# Patient Record
Sex: FEMALE | Race: OTHER | HISPANIC OR LATINO | Employment: FULL TIME | URBAN - METROPOLITAN AREA
[De-identification: names, ages, dates, MRNs, and addresses within clinical notes are randomized per-mention and may not be internally consistent; named-entity substitution may affect disease eponyms.]

---

## 2021-05-07 ENCOUNTER — OFFICE VISIT (OUTPATIENT)
Dept: OBGYN CLINIC | Facility: CLINIC | Age: 26
End: 2021-05-07

## 2021-05-07 VITALS
WEIGHT: 137.8 LBS | DIASTOLIC BLOOD PRESSURE: 76 MMHG | RESPIRATION RATE: 16 BRPM | SYSTOLIC BLOOD PRESSURE: 122 MMHG | HEIGHT: 62 IN | HEART RATE: 86 BPM | BODY MASS INDEX: 25.36 KG/M2

## 2021-05-07 DIAGNOSIS — Z01.419 ENCOUNTER FOR ANNUAL ROUTINE GYNECOLOGICAL EXAMINATION: Primary | ICD-10-CM

## 2021-05-07 DIAGNOSIS — Z12.4 CERVICAL CANCER SCREENING: ICD-10-CM

## 2021-05-07 DIAGNOSIS — Z72.51 HIGH RISK HETEROSEXUAL BEHAVIOR: ICD-10-CM

## 2021-05-07 PROCEDURE — 99385 PREV VISIT NEW AGE 18-39: CPT | Performed by: OBSTETRICS & GYNECOLOGY

## 2021-05-07 PROCEDURE — G0145 SCR C/V CYTO,THINLAYER,RESCR: HCPCS | Performed by: FAMILY MEDICINE

## 2021-05-07 RX ORDER — LORATADINE 10 MG/1
10 TABLET ORAL DAILY
COMMUNITY

## 2021-05-07 NOTE — ASSESSMENT & PLAN NOTE
Patient here for annual visit  She admits to not ever having a breast exam or pelvic exam/pap smear done  Sexually active currently, not using any birth control or barrier   No complaints today    - Breast exam performed today  - PAP smear today  - GC/Chlamydia ordered    Will contact patient with results

## 2021-05-07 NOTE — PROGRESS NOTES
[facility] Office Visit  Jocelynn Hicks 31 y o  female   MRN: [id number] : 1995  ENCOUNTER: 2021 10:43 AM    Assessment and Plan   Encounter for annual routine gynecological examination  Patient here for annual visit  She admits to not ever having a breast exam or pelvic exam/pap smear done  Sexually active currently, not using any birth control or barrier  No complaints today    - Breast exam performed today  - PAP smear today  - GC/Chlamydia ordered    Will contact patient with results      Chief Complaint     Chief Complaint   Patient presents with    Gynecologic Exam       History of Present Illness   Jocelynn Hicks is a 31y o -year-old female who presents today for an annual exam  She currently has no complaints  States that she has never had a PAP smear or pelvic exam done  No STD testing history  Up to date on HPV vaccination  Review of Systems   Review of Systems   Constitutional: Negative for chills, fatigue and fever  Respiratory: Negative for cough, chest tightness, shortness of breath and wheezing  Cardiovascular: Negative for chest pain and palpitations  Gastrointestinal: Negative for abdominal distention, abdominal pain, constipation, diarrhea, nausea and vomiting  Genitourinary: Negative for difficulty urinating, dyspareunia, dysuria, hematuria, pelvic pain, vaginal bleeding, vaginal discharge and vaginal pain  All other systems reviewed and are negative  Active Problem List     Patient Active Problem List   Diagnosis    Encounter for annual routine gynecological examination       Past Medical History, Past Surgical History, Family History, and Social History were reviewed and updated today as appropriate      Objective   /76 (BP Location: Right arm, Patient Position: Sitting, Cuff Size: Standard)   Pulse 86   Resp 16   Ht 5' 2" (1 575 m)   Wt 62 5 kg (137 lb 12 8 oz)   LMP 2021 (Exact Date)   BMI 25 20 kg/m²     Physical Exam  Vitals signs reviewed  Constitutional:       General: She is not in acute distress  Appearance: She is well-developed  She is not diaphoretic  HENT:      Head: Normocephalic and atraumatic  Eyes:      General: No scleral icterus  Right eye: No discharge  Left eye: No discharge  Conjunctiva/sclera: Conjunctivae normal    Pulmonary:      Effort: Pulmonary effort is normal  No respiratory distress  Chest:      Breasts: Breasts are symmetrical          Right: Normal  No bleeding  Left: Normal  No bleeding  Abdominal:      General: There is no distension  Palpations: Abdomen is soft  Tenderness: There is no abdominal tenderness  Genitourinary:     General: Normal vulva  Musculoskeletal: Normal range of motion  General: No tenderness  Lymphadenopathy:      Cervical: No cervical adenopathy  Skin:     General: Skin is warm and dry  Coloration: Skin is not pale  Findings: No erythema  Neurological:      Mental Status: She is alert  Pertinent Laboratory/Diagnostic Studies:  No results found for: GLUCOSE, BUN, CREATININE, CALCIUM, NA, K, CO2, CL  No results found for: ALT, AST, GGT, ALKPHOS, BILITOT    No results found for: WBC, HGB, HCT, MCV, PLT    No results found for: TSH    No results found for: CHOL  No results found for: TRIG  No results found for: HDL  No results found for: LDLCALC  No results found for: HGBA1C    No results found for this or any previous visit  Orders Placed This Encounter   Procedures    Chlamydia/GC amplified DNA by PCR         Current Medications     Current Outpatient Medications   Medication Sig Dispense Refill    loratadine (CLARITIN) 10 mg tablet Take 10 mg by mouth daily      Triamcinolone Acetonide (NASACORT ALLERGY 24HR NA) into each nostril       No current facility-administered medications for this visit          ALLERGIES:  No Known Allergies    Health Maintenance     Health Maintenance   Topic Date Due  HPV Vaccine (1 - 2-dose series) Never done    Depression Screening PHQ  Never done    HIV Screening  Never done    COVID-19 Vaccine (1) Never done    BMI: Followup Plan  Never done    Annual Physical  Never done    DTaP,Tdap,and Td Vaccines (1 - Tdap) Never done    Cervical Cancer Screening  Never done    Influenza Vaccine (Season Ended) 09/01/2021    BMI: Adult  05/07/2022    Pneumococcal Vaccine: Pediatrics (0 to 5 Years) and At-Risk Patients (6 to 59 Years)  Aged Out    HIB Vaccine  Aged Out    Hepatitis B Vaccine  Aged Out    IPV Vaccine  Aged Out    Hepatitis A Vaccine  Aged Out    Meningococcal ACWY Vaccine  Aged Out       There is no immunization history on file for this patient  Bart Arboleda MD   [address]  5/7/2021  10:43 AM    Parts of this note were dictated using Spreadsave dictation software and may have sounds-like errors due to variation in pronunciation

## 2021-05-14 LAB
C TRACH DNA SPEC QL NAA+PROBE: ABNORMAL
LAB AP GYN PRIMARY INTERPRETATION: NORMAL
Lab: NORMAL
N GONORRHOEA DNA SPEC QL NAA+PROBE: ABNORMAL

## 2021-05-24 ENCOUNTER — TELEPHONE (OUTPATIENT)
Dept: OBGYN CLINIC | Facility: CLINIC | Age: 26
End: 2021-05-24

## 2021-05-24 NOTE — TELEPHONE ENCOUNTER
Informed pt of normal pap result  Gonorrhea/chlamydia  specimen invalid, pt verbalizes understanding  Offered to  place new order for patient to go to lab to have redone  Patient declines

## 2021-07-07 NOTE — PROGRESS NOTES
Assessment/Plan:     Diagnoses and all orders for this visit:    Abscess of left Bartholin's gland    Other orders  -     cephalexin (KEFLEX) 500 mg capsule  -     sulfamethoxazole-trimethoprim (BACTRIM DS) 800-160 mg per tablet  -     meclizine (ANTIVERT) 25 mg tablet; Take 25 mg by mouth          Subjective:      Patient ID: Kylee Del Rosario is a 31 y o  female  Patient with previous history of Bartholin's cysts presents for I&D of left Bartholin's abscess  Please see procedure note  Review of Systems   Constitutional: Negative  HENT: Negative  Eyes: Negative  Respiratory: Negative  Cardiovascular: Negative  Gastrointestinal: Negative  Endocrine: Negative  Genitourinary:        Perineal pain   Musculoskeletal: Negative  Skin: Negative  Neurological: Negative  Psychiatric/Behavioral: Negative  Objective:      /75 (BP Location: Right arm, Patient Position: Sitting, Cuff Size: Adult)   Pulse 103   Ht 5' 2" (1 575 m)   Wt 56 2 kg (124 lb)   LMP 06/30/2021 (Exact Date)   BMI 22 68 kg/m²          Physical Exam  Exam conducted with a chaperone present  Constitutional:       General: She is not in acute distress  Appearance: She is well-developed  She is not diaphoretic  HENT:      Head: Normocephalic and atraumatic  Eyes:      Pupils: Pupils are equal, round, and reactive to light  Neck:      Trachea: No tracheal deviation  Cardiovascular:      Rate and Rhythm: Normal rate  Pulmonary:      Effort: Pulmonary effort is normal  No respiratory distress  Breath sounds: No stridor  Abdominal:      General: There is no distension  Palpations: Abdomen is soft  Genitourinary:     General: Normal vulva  Labia:         Right: No rash, tenderness, lesion or injury  Left: Rash, tenderness and lesion present  No injury  Musculoskeletal:         General: No tenderness or deformity  Normal range of motion        Cervical back: Normal range of motion  Skin:     General: Skin is warm and dry  Coloration: Skin is not pale  Findings: No erythema or rash  Neurological:      Mental Status: She is alert and oriented to person, place, and time        Coordination: Coordination normal

## 2021-07-08 ENCOUNTER — OFFICE VISIT (OUTPATIENT)
Dept: OBGYN CLINIC | Facility: CLINIC | Age: 26
End: 2021-07-08

## 2021-07-08 VITALS
HEART RATE: 103 BPM | DIASTOLIC BLOOD PRESSURE: 75 MMHG | WEIGHT: 124 LBS | SYSTOLIC BLOOD PRESSURE: 116 MMHG | HEIGHT: 62 IN | BODY MASS INDEX: 22.82 KG/M2

## 2021-07-08 DIAGNOSIS — N75.1 ABSCESS OF LEFT BARTHOLIN'S GLAND: Primary | ICD-10-CM

## 2021-07-08 PROCEDURE — 56420 I&D BARTHOLINS GLAND ABSCESS: CPT | Performed by: OBSTETRICS & GYNECOLOGY

## 2021-07-08 PROCEDURE — 99213 OFFICE O/P EST LOW 20 MIN: CPT | Performed by: OBSTETRICS & GYNECOLOGY

## 2021-07-08 RX ORDER — CEPHALEXIN 500 MG/1
CAPSULE ORAL
COMMUNITY
Start: 2021-07-07

## 2021-07-08 RX ORDER — SULFAMETHOXAZOLE AND TRIMETHOPRIM 800; 160 MG/1; MG/1
TABLET ORAL
COMMUNITY
Start: 2021-07-07

## 2021-07-08 RX ORDER — MECLIZINE HYDROCHLORIDE 25 MG/1
25 TABLET ORAL
COMMUNITY

## 2021-07-08 NOTE — LETTER
July 8, 2021     Patient: Lashawn Johnson   YOB: 1995   Date of Visit: 7/8/2021       To Whom it May Concern:    Lashawn Johnson is under my professional care  She was seen in my office on 7/8/2021  She may return to work on 7/12/2021  If you have any questions or concerns, please don't hesitate to call           Sincerely,          Leeanne Lazar MD        CC: Lashawn Romeroom

## 2021-07-09 NOTE — PROGRESS NOTES
Incision and drain    Date/Time: 7/8/2021 11:00 AM  Performed by: Rodrigue Bobby MD  Authorized by: Rodrigue Bobby MD   Universal Protocol:  Procedure performed by:  Consent: Written consent obtained  Risks and benefits: risks, benefits and alternatives were discussed  Consent given by: patient  Time out: Immediately prior to procedure a "time out" was called to verify the correct patient, procedure, equipment, support staff and site/side marked as required  Patient understanding: patient states understanding of the procedure being performed  Patient consent: the patient's understanding of the procedure matches consent given  Procedure consent: procedure consent matches procedure scheduled  Relevant documents: relevant documents present and verified  Required items: required blood products, implants, devices, and special equipment available  Patient identity confirmed: verbally with patient      Patient location:  Clinic  Location:     Type:  Bartholin cyst and abscess    Size:  3-4cm    Location:  Anogenital    Anogenital location:  Bartholin's gland  Pre-procedure details:     Skin preparation:  Betadine  Anesthesia (see MAR for exact dosages): Anesthesia method:  Local infiltration    Local anesthetic:  Lidocaine 1% w/o epi  Procedure details:     Complexity:  Simple    Needle aspiration: no      Incision types:  Stab incision    Scalpel blade:  10    Approach:  Open    Incision depth:  Submucosal    Wound management:  Probed and deloculated    Drainage:  Bloody and purulent    Drainage amount: Moderate    Wound treatment:  Drain placed    Packing materials: Word catheter  Post-procedure details:     Patient tolerance of procedure:   Tolerated well, no immediate complications

## 2021-07-15 ENCOUNTER — OFFICE VISIT (OUTPATIENT)
Dept: OBGYN CLINIC | Facility: CLINIC | Age: 26
End: 2021-07-15

## 2021-07-15 VITALS
SYSTOLIC BLOOD PRESSURE: 124 MMHG | HEIGHT: 62 IN | DIASTOLIC BLOOD PRESSURE: 83 MMHG | WEIGHT: 128 LBS | BODY MASS INDEX: 23.55 KG/M2 | HEART RATE: 61 BPM

## 2021-07-15 DIAGNOSIS — N75.0 BARTHOLIN CYST: Primary | ICD-10-CM

## 2021-07-15 PROCEDURE — 99213 OFFICE O/P EST LOW 20 MIN: CPT | Performed by: OBSTETRICS & GYNECOLOGY

## 2021-07-16 NOTE — PROGRESS NOTES
Post-op Note - OB/GYN   Yamileth Palacios 31 y o  female MRN: [id number]  Unit/Bed#:  Encounter: [id number]    Assessment:  31 y o  G0 s/p incision and drainage of Bartholin cyst with word catheter placement    Plan:  No further follow up  Annual due in September    Subjective/Objective     Patient reports word catheter fell out after three days and she had one additional day of drainage afterward  Denies pain, further drainage  Currently attempting pregnancy  Cleared for sexual activity  Counseled on taking a prenatal vitamin  Up to date with Pap  Counseled on management and recurrence  Objective:     Vitals: Blood pressure 124/83, pulse 61, height 5' 2" (1 575 m), weight 58 1 kg (128 lb), last menstrual period 06/30/2021, not currently breastfeeding  Physical Exam:     Physical Exam  Constitutional:       General: She is not in acute distress  Appearance: She is well-developed  She is not diaphoretic  HENT:      Head: Normocephalic and atraumatic  Eyes:      Pupils: Pupils are equal, round, and reactive to light  Neck:      Trachea: No tracheal deviation  Cardiovascular:      Rate and Rhythm: Normal rate  Pulmonary:      Effort: Pulmonary effort is normal  No respiratory distress  Breath sounds: No stridor  Abdominal:      General: There is no distension  Palpations: Abdomen is soft  Tenderness: There is no abdominal tenderness  There is no guarding or rebound  Genitourinary:     Labia:         Right: No rash, tenderness, lesion or injury  Left: No rash, tenderness, lesion or injury  Musculoskeletal:         General: No tenderness or deformity  Normal range of motion  Cervical back: Normal range of motion  Skin:     General: Skin is warm and dry  Coloration: Skin is not pale  Findings: No erythema or rash  Neurological:      Mental Status: She is alert and oriented to person, place, and time        Coordination: Coordination normal  No results found for: WBC, HGB, HCT, MCV, PLT            Marlene Olivia MD  07/16/21

## 2022-09-06 ENCOUNTER — OFFICE VISIT (OUTPATIENT)
Dept: OBGYN CLINIC | Facility: CLINIC | Age: 27
End: 2022-09-06

## 2022-09-06 VITALS
HEIGHT: 62 IN | DIASTOLIC BLOOD PRESSURE: 73 MMHG | BODY MASS INDEX: 22.93 KG/M2 | WEIGHT: 124.6 LBS | SYSTOLIC BLOOD PRESSURE: 108 MMHG | HEART RATE: 109 BPM

## 2022-09-06 DIAGNOSIS — N92.6 MISSED PERIOD: Primary | ICD-10-CM

## 2022-09-06 DIAGNOSIS — Z31.69 ENCOUNTER FOR INFERTILITY COUNSELING: ICD-10-CM

## 2022-09-06 LAB — SL AMB POCT URINE HCG: NEGATIVE

## 2022-09-06 PROCEDURE — 81025 URINE PREGNANCY TEST: CPT | Performed by: OBSTETRICS & GYNECOLOGY

## 2022-09-06 PROCEDURE — 99213 OFFICE O/P EST LOW 20 MIN: CPT | Performed by: OBSTETRICS & GYNECOLOGY

## 2022-09-06 RX ORDER — LEVOCETIRIZINE DIHYDROCHLORIDE 5 MG/1
5 TABLET, FILM COATED ORAL EVERY EVENING
COMMUNITY

## 2022-09-06 NOTE — PROGRESS NOTES
[address]   Infertility Visit  Name: Jami Pedroza  MRN: [id number]  : 1995      ASSESSMENT/PLAN:  Problem   Encounter for Infertility Counseling    Regular menses every 28-30 days, lasting 5 days  No significant PMHx or FHx  Denies prior vaginal or abdominal surgeries  Denies prior STD/STIs  Plan for male semen analysis  Plan for HSG for assessment of tubal patency during days 5-9 of menses  Patient to call clinic with first day of menses for appropriate scheduling     Missed Period    Due for period yesterday   Urine pregnancy test negative           SUBJECTIVE:  Patient is a pleasant 30yo G0 who presents to discuss infertility accompanied by her partner, Amanda Bach and Amanda Canales have been trying to attain pregnancy for the last year without success  Neither patient nor partner report a history of STD/STIs or significant trauma requiring surgical intervention  Amanda Canales has not had pregnancies with other partners and has not completed semen analysis  Dariana Ho denies history of abdominal or vaginal surgeries  She reports monthly menses occurring every 28-30 days lasting 5 days  Denies any chronic medical conditions  Denies significant family history  We discussed further assessment consisting of male semen analysis and imaging to assess tubal patency  Patient is amenable to further workup  We discussed follow-up in clinic one week after imaging to discuss results  Patient encouraged to call clinic with any future questions or concerns  OBJECTIVE:  Vitals:    22 1118   BP: 108/73   Pulse: (!) 109       Physical Exam  Vitals and nursing note reviewed  Constitutional:       General: She is not in acute distress  HENT:      Head: Normocephalic  Right Ear: External ear normal       Left Ear: External ear normal    Eyes:      General: No scleral icterus  Right eye: No discharge  Left eye: No discharge        Conjunctiva/sclera: Conjunctivae normal  Cardiovascular:      Rate and Rhythm: Normal rate and regular rhythm  Pulses: Normal pulses  Heart sounds: Normal heart sounds  Pulmonary:      Effort: Pulmonary effort is normal  No respiratory distress  Breath sounds: Normal breath sounds  Abdominal:      General: Abdomen is flat  There is no distension  Palpations: Abdomen is soft  Tenderness: There is no abdominal tenderness  There is no guarding  Musculoskeletal:         General: No swelling or tenderness  Normal range of motion  Cervical back: Normal range of motion  Right lower leg: No edema  Left lower leg: No edema  Skin:     General: Skin is warm and dry  Capillary Refill: Capillary refill takes less than 2 seconds  Neurological:      Mental Status: She is alert and oriented to person, place, and time  Mental status is at baseline     Psychiatric:         Mood and Affect: Mood normal          Behavior: Behavior normal            Louie Pastrana MD  OB/GYN PGY-2  9/6/2022  11:48 AM

## 2022-10-03 ENCOUNTER — TELEPHONE (OUTPATIENT)
Dept: OBGYN CLINIC | Facility: CLINIC | Age: 27
End: 2022-10-03

## 2022-10-03 NOTE — TELEPHONE ENCOUNTER
LM to notify pt she will have to call office with first day of period to have HSG scheduled  Call back number provided

## 2024-02-21 PROBLEM — Z01.419 ENCOUNTER FOR ANNUAL ROUTINE GYNECOLOGICAL EXAMINATION: Status: RESOLVED | Noted: 2021-05-07 | Resolved: 2024-02-21

## 2024-03-11 ENCOUNTER — OFFICE VISIT (OUTPATIENT)
Dept: OBGYN CLINIC | Facility: CLINIC | Age: 29
End: 2024-03-11

## 2024-03-11 VITALS
DIASTOLIC BLOOD PRESSURE: 79 MMHG | HEIGHT: 62 IN | BODY MASS INDEX: 24.84 KG/M2 | SYSTOLIC BLOOD PRESSURE: 117 MMHG | RESPIRATION RATE: 18 BRPM | WEIGHT: 135 LBS | HEART RATE: 82 BPM

## 2024-03-11 DIAGNOSIS — R10.2 PELVIC PAIN: Primary | ICD-10-CM

## 2024-03-11 PROCEDURE — 99213 OFFICE O/P EST LOW 20 MIN: CPT | Performed by: OBSTETRICS & GYNECOLOGY

## 2024-03-11 RX ORDER — MONTELUKAST SODIUM 10 MG/1
10 TABLET ORAL EVERY EVENING
COMMUNITY
Start: 2024-02-13

## 2024-03-11 NOTE — ASSESSMENT & PLAN NOTE
Acute, no risk factors for STDs  Likely due to a ruptured cyst    Plan:  Ibuprofen 400 mg q 6 h PRN  Transvaginal US  Follow up in 2 weeks for scheduled annual visit

## 2024-03-11 NOTE — PROGRESS NOTES
"Assessment/Plan:    Pelvic pain  Acute, no risk factors for STDs  Likely due to a ruptured cyst    Plan:  Ibuprofen 400 mg q 6 h PRN  Transvaginal US  Follow up in 2 weeks for scheduled annual visit        Diagnoses and all orders for this visit:    Pelvic pain  -     US pelvis complete w transvaginal; Future    Other orders  -     montelukast (SINGULAIR) 10 mg tablet; Take 10 mg by mouth every evening          Subjective:      Patient ID: Mikala Cunha is a 28 y.o. female.    Pelvic Pain  The patient's primary symptoms include pelvic pain. This is a new problem. Episode onset: 3 days ago. The problem occurs constantly. The problem has been unchanged (Pain was triggered after intercourse. Pain was really bad after, improved a litte, byt has been constant since then.). The pain is moderate. The problem affects both sides. She is not pregnant. Pertinent negatives include no constipation, diarrhea, dysuria, hematuria, nausea or rash. The symptoms are aggravated by activity. She has tried NSAIDs (took one pill of 200 mg of Advil) for the symptoms. The treatment provided no relief. She is sexually active. No, her partner does not have an STD. She uses nothing for contraception. Her menstrual history has been regular. There is no history of an abdominal surgery, a  section, miscarriage, ovarian cysts or vaginosis.     LMP: 24    The following portions of the patient's history were reviewed and updated as appropriate: allergies, current medications, past medical history, past social history, past surgical history, and problem list.    Review of Systems   Gastrointestinal:  Negative for constipation, diarrhea and nausea.   Genitourinary:  Positive for pelvic pain. Negative for dysuria and hematuria.   Skin:  Negative for rash.         Objective:      /79 (BP Location: Right arm, Patient Position: Sitting, Cuff Size: Adult)   Pulse 82   Resp 18   Ht 5' 2\" (1.575 m)   Wt 61.2 kg (135 lb)   LMP " 02/24/2024 (Exact Date)   BMI 24.69 kg/m²          Physical Exam  Constitutional:       General: She is not in acute distress.     Appearance: She is normal weight. She is not ill-appearing or toxic-appearing.   HENT:      Head: Normocephalic and atraumatic.      Right Ear: External ear normal.      Left Ear: External ear normal.   Eyes:      General: No scleral icterus.     Conjunctiva/sclera: Conjunctivae normal.   Abdominal:      General: Abdomen is flat. There is no distension.      Palpations: Abdomen is soft. There is no mass.      Tenderness: There is abdominal tenderness. There is no right CVA tenderness, left CVA tenderness, guarding or rebound.      Comments: Infraumbilical tenderness.   Skin:     General: Skin is warm and dry.      Capillary Refill: Capillary refill takes less than 2 seconds.   Neurological:      Mental Status: She is alert.

## 2024-03-14 ENCOUNTER — HOSPITAL ENCOUNTER (OUTPATIENT)
Dept: ULTRASOUND IMAGING | Facility: HOSPITAL | Age: 29
Discharge: HOME | End: 2024-03-14
Payer: COMMERCIAL

## 2024-03-14 DIAGNOSIS — R10.2 PELVIC PAIN: ICD-10-CM

## 2024-03-14 PROCEDURE — 76856 US EXAM PELVIC COMPLETE: CPT

## 2024-03-14 PROCEDURE — 76830 TRANSVAGINAL US NON-OB: CPT

## 2024-04-04 ENCOUNTER — TELEPHONE (OUTPATIENT)
Dept: OBGYN CLINIC | Facility: CLINIC | Age: 29
End: 2024-04-04

## 2024-04-04 NOTE — TELEPHONE ENCOUNTER
Spoke to pt, discussed finding of US, nothing concerning at this time.  Same fibroid present.  Pain has resolved, will follow up as needed.

## 2024-12-06 ENCOUNTER — ULTRASOUND (OUTPATIENT)
Dept: OBGYN CLINIC | Facility: CLINIC | Age: 29
End: 2024-12-06

## 2024-12-06 VITALS — DIASTOLIC BLOOD PRESSURE: 74 MMHG | WEIGHT: 167.4 LBS | SYSTOLIC BLOOD PRESSURE: 112 MMHG | BODY MASS INDEX: 30.62 KG/M2

## 2024-12-06 DIAGNOSIS — O36.63X0 EXCESSIVE FETAL GROWTH AFFECTING MANAGEMENT OF PREGNANCY IN THIRD TRIMESTER, SINGLE OR UNSPECIFIED FETUS: ICD-10-CM

## 2024-12-06 DIAGNOSIS — Z34.93 THIRD TRIMESTER PREGNANCY: Primary | ICD-10-CM

## 2024-12-06 PROCEDURE — PNV: Performed by: PHYSICIAN ASSISTANT

## 2024-12-06 RX ORDER — BLOOD SUGAR DIAGNOSTIC
STRIP MISCELLANEOUS
COMMUNITY
Start: 2024-12-05

## 2024-12-06 RX ORDER — RIBOFLAVIN (VITAMIN B2) 100 MG
100 TABLET ORAL DAILY
COMMUNITY

## 2024-12-06 RX ORDER — ALBUTEROL SULFATE 90 UG/1
2 INHALANT RESPIRATORY (INHALATION) EVERY 6 HOURS PRN
COMMUNITY

## 2024-12-06 RX ORDER — ACYCLOVIR 400 MG/1
TABLET ORAL
COMMUNITY
Start: 2024-12-04

## 2024-12-06 NOTE — PROGRESS NOTES
OB/GYN  PN Visit  Mikala Cunha  [id number]  2024  7:12 AM  Kaitlyn Perez PA-C    S: 29 y.o.  Unknown here for PN visit. Pregnancy complicated by questionable diagnosis of GDM and LGA.     OB complaints:  Denies c/o n/v/ha, no edema, no smoking, no DV.   No vb/lof  No cramping/ctxns or signs of PTL.    She reports that she is transferring to us from [facility].  She notes that at one of her recent MFM appts baby noted to have >AC and polyhydramnios. She was given Dexcom and diagnosis of GDM. Pt passed 1 hour glucose at 132 and did not do 3 hour GTT. She notes that her dexcom was not calibrated and questions the accuracy of the results that they are basing their plan off of.       O:    Pre-Myrna Vitals    Flowsheet Row Most Recent Value   Prenatal Assessment    Prenatal Vitals    Blood Pressure 112/74   Weight - Scale 75.9 kg (167 lb 6.4 oz)   Urine Albumin/Glucose    Dilation/Effacement/Station    Vaginal Drainage    Edema             Gen: no acute distress, nonlabored breathing.  OB exam completed: fundal height, +FHT.  Urine: -/-    A/P:    1. Third trimester pregnancy  -     Ambulatory Referral to Maternal Fetal Medicine; Future; Expected date: 2024  2. Excessive fetal growth affecting management of pregnancy in third trimester, single or unspecified fetus  -     Ambulatory Referral to Maternal Fetal Medicine; Future; Expected date: 2024  -     Ambulatory Referral to Maternal Fetal Medicine; Future; Expected date: 2024      #1. Unknown GESTATION  Labor precautions reviewed  Fetal kick counts reviewed      Overview:     Labs UTD  Pap smear:       GC/CT: will runoff of urine  Prenatal Panel: normal  Blood Type: O+ RhoGam not indicated   GBS: plan at 36 weeks    Genetic Testing: neg CF and SMN carrier, normal NIPS    Vaccines:  Tdap: 11/15/24   Flu: declines    Birth Plan:  if able  Yellow packet given and consents signed to be returned.   Feeding Plan: breast   Breast pump: has  one at home  Pediatrician: will plan to establish w her peds office  Contraception:   Ultrasounds: 11/29/24 vertex, Roxanne Wnl (17.5cm) 11/15/24 AC > 95th%      RTC in 2 weeks    Kaitlyn Perez PA-C  12/11/2024  7:12 AM

## 2024-12-09 ENCOUNTER — TELEPHONE (OUTPATIENT)
Age: 29
End: 2024-12-09

## 2024-12-09 ENCOUNTER — TELEPHONE (OUTPATIENT)
Facility: HOSPITAL | Age: 29
End: 2024-12-09

## 2024-12-09 NOTE — TELEPHONE ENCOUNTER
Patient transferring from [facility] Maternal Fetal Medicine.  Decision tree denied scheduling.  Please call with an appointment.  Thank you.

## 2024-12-09 NOTE — TELEPHONE ENCOUNTER
----- Message from Alex Cabrera MD sent at 12/9/2024  1:30 PM EST -----  Hi Ignacia,    She had a normal detailed US and fetal echo with [facility] so she will only need a growth US with us.    Thank you,    Ryan  ----- Message -----  From: Juliana Reyes  Sent: 12/9/2024  12:33 PM EST  To: Alex Cabrera MD    Hi Dr. Cabrera! This PT is a tx from [facility]. Does she need a detailed with us, or just a growth?

## 2024-12-10 NOTE — PATIENT INSTRUCTIONS
-Dexcom G7 CGM in place  with GMI 6%.  -Check A1c.  -Start self monitoring blood glucose fasting and 2 hours post start of each meal.  -Report via glucose flowsheet every Thursday.   -Glucose goals fasting 60-95 mg/dL; 2 hours post start of each meal 120 mg/dL or less.   -Aim to eat every 2 to 3.5 hours during the day with a combination of carbohydrates and protein per meal/snack.   -Minimum total daily carbohydrates 175 grams paired with half the grams in protein.  -No more than 8 to 10 hours of fasting overnight.  -Always have a bedtime snack with combination of carbohydrates and protein.  -Refer to [employer] online.   -Walk up to 30 minutes a day.   -Keep a 3 day diet log to review with dietitian.   -Follow up with dietitian as recommended.   -Recommended weight gain with pre-pregnancy BMI <24 would be 25 to 35 lbs. Current weight gain 34 lbs at 32 weeks gestation.   -If dietary modifications are ineffective with maintaining glucose readings within goal; insulin and/or Metformin will be recommended. First line agent would be insulin.   -If anti-diabetes medications are started; increase in fetal surveillance will be recommended.   -Hyperglycemia during pregnancy is associated with fetal macrosomia; birth injury;  hypoglycemia; polyhydramnios; pre-term labor; pre-eclampsia; ; etc.   -Continue follow up with OB and MFM as recommended.  -Stay in close contact with diabetes team.   Thank you for choosing us for your  care today.  If you have any questions about your ultrasound or care, please do not hesitate to contact us or your primary obstetrician.        Some general instructions for your pregnancy are:    Exercise: Aim for 150 minutes per week of regular exercise.  Walking is great!  Nutrition: Choose healthy sources of calcium, iron, and protein.  Avoid ultraprocessed foods and added sugar.  Learn about Preeclampsia: preeclampsia is a common, potentially serious high blood  pressure complication in pregnancy.  A blood pressure of 140mmHg (systolic or top number) or 90mmHg (diastolic or bottom number) should be evaluated by your doctor.  Aspirin is sometimes prescribed in early pregnancy to prevent preeclampsia in women with risk factors - ask your obstetrician if you should be on this medication.  For more resources, visit:  https://www.[email or web address].org/preeclampsia  If you smoke, please try to quit completely but also try to reduce your smoking by as much as possible (as soon as possible).  Do not vape.  Please also avoid cannabis products.  Other warning signs to watch out for in pregnancy or postpartum: chest pain, obstructed breathing or shortness of breath, seizures, thoughts of hurting yourself or your baby, bleeding, a painful or swollen leg, fever, or headache (see [employer] POST-BIRTH Warning Signs campaign).  If these happen call 911.  Itching is also not normal in pregnancy and if you experience this, especially over your hands and feet, potentially worse at night, notify your doctors.

## 2024-12-11 ENCOUNTER — ULTRASOUND (OUTPATIENT)
Dept: PERINATAL CARE | Facility: CLINIC | Age: 29
End: 2024-12-11
Payer: COMMERCIAL

## 2024-12-11 VITALS
HEIGHT: 62 IN | BODY MASS INDEX: 30.29 KG/M2 | WEIGHT: 164.6 LBS | SYSTOLIC BLOOD PRESSURE: 124 MMHG | DIASTOLIC BLOOD PRESSURE: 62 MMHG | HEART RATE: 101 BPM

## 2024-12-11 DIAGNOSIS — Z36.89 ENCOUNTER FOR ULTRASOUND TO CHECK FETAL GROWTH: ICD-10-CM

## 2024-12-11 DIAGNOSIS — Z3A.32 32 WEEKS GESTATION OF PREGNANCY: Primary | ICD-10-CM

## 2024-12-11 DIAGNOSIS — O34.10 UTERINE FIBROID IN PREGNANCY: ICD-10-CM

## 2024-12-11 DIAGNOSIS — R00.2 PALPITATIONS: ICD-10-CM

## 2024-12-11 DIAGNOSIS — Z34.93 THIRD TRIMESTER PREGNANCY: ICD-10-CM

## 2024-12-11 DIAGNOSIS — D25.9 UTERINE FIBROID IN PREGNANCY: ICD-10-CM

## 2024-12-11 DIAGNOSIS — O99.810 ABNORMAL GLUCOSE AFFECTING PREGNANCY: ICD-10-CM

## 2024-12-11 DIAGNOSIS — O36.63X0 EXCESSIVE FETAL GROWTH AFFECTING MANAGEMENT OF PREGNANCY IN THIRD TRIMESTER, SINGLE OR UNSPECIFIED FETUS: ICD-10-CM

## 2024-12-11 PROCEDURE — 99204 OFFICE O/P NEW MOD 45 MIN: CPT | Performed by: STUDENT IN AN ORGANIZED HEALTH CARE EDUCATION/TRAINING PROGRAM

## 2024-12-11 PROCEDURE — 76816 OB US FOLLOW-UP PER FETUS: CPT | Performed by: STUDENT IN AN ORGANIZED HEALTH CARE EDUCATION/TRAINING PROGRAM

## 2024-12-11 NOTE — LETTER
2024     Kaitlyn Perez PA-C  [address] [facility]  [place] [address]    Patient: Mikala Cunha   YOB: 1995   Date of Visit: 2024       Dear Dr. Perez:    Thank you for referring Mikala Cunha to me for evaluation. Below are my notes for this consultation.    If you have questions, please do not hesitate to call me. I look forward to following your patient along with you.         Sincerely,        Almaz Jc MD        CC: No Recipients    Almaz Jc MD  2024  1:43 PM  Sign when Signing Visit  This patient received  care under my supervision on 24 at 32w0d gestational age at [facility].  The note is contained in the ultrasound report located under OB Procedures tab in Epic.  Please call our office at [phone] with questions.  -Almaz Jc MD

## 2024-12-11 NOTE — PROGRESS NOTES
This patient received  care under my supervision on 24 at 32w0d gestational age at [facility].  The note is contained in the ultrasound report located under OB Procedures tab in Epic.  Please call our office at [phone] with questions.  -Almaz Jc MD

## 2024-12-12 ENCOUNTER — TELEMEDICINE (OUTPATIENT)
Facility: HOSPITAL | Age: 29
End: 2024-12-12
Payer: COMMERCIAL

## 2024-12-12 DIAGNOSIS — O36.63X0 EXCESSIVE FETAL GROWTH AFFECTING MANAGEMENT OF PREGNANCY IN THIRD TRIMESTER, SINGLE OR UNSPECIFIED FETUS: Primary | ICD-10-CM

## 2024-12-12 DIAGNOSIS — O99.810 ABNORMAL GLUCOSE COMPLICATING PREGNANCY: ICD-10-CM

## 2024-12-12 DIAGNOSIS — Z3A.32 32 WEEKS GESTATION OF PREGNANCY: ICD-10-CM

## 2024-12-12 PROCEDURE — 95251 CONT GLUC MNTR ANALYSIS I&R: CPT | Performed by: NURSE PRACTITIONER

## 2024-12-12 PROCEDURE — 99417 PROLNG OP E/M EACH 15 MIN: CPT | Performed by: NURSE PRACTITIONER

## 2024-12-12 PROCEDURE — 99215 OFFICE O/P EST HI 40 MIN: CPT | Performed by: NURSE PRACTITIONER

## 2024-12-12 RX ORDER — LANCETS 30 GAUGE
EACH MISCELLANEOUS
COMMUNITY
Start: 2024-11-25

## 2024-12-12 RX ORDER — BLOOD-GLUCOSE METER
KIT MISCELLANEOUS
COMMUNITY
Start: 2024-11-25

## 2024-12-12 NOTE — PROGRESS NOTES
Virtual Regular Visit  Name: Miklaa Cunha      : 1995      MRN: [id number]  Encounter Provider: LUIS Dsouza  Encounter Date: 2024   Encounter department: [facility]      Verification of patient location:  Patient is located at Other in the following state in which I hold an active license [place] :  Assessment & Plan  Excessive fetal growth affecting management of pregnancy in third trimester, single or unspecified fetus  -Possible macrosomia.  -Maternal weight gain up to date 34 lbs at 32 weeks GA.   -Will start SMBG and follow GDM meal plan.  Orders:  •  Ambulatory Referral to Maternal Fetal Medicine  •  Hemoglobin A1C; Standing  •  Mychart glucose flowsheet    Abnormal glucose complicating pregnancy  -Dexcom G7 CGM in place  with GMI 6%.  -Check A1c.  -Start self monitoring blood glucose fasting and 2 hours post start of each meal.  -Report via glucose flowsheet every Thursday.   -Glucose goals fasting 60-95 mg/dL; 2 hours post start of each meal 120 mg/dL or less.   -Aim to eat every 2 to 3.5 hours during the day with a combination of carbohydrates and protein per meal/snack.   -Minimum total daily carbohydrates 175 grams paired with half the grams in protein.  -No more than 8 to 10 hours of fasting overnight.  -Always have a bedtime snack with combination of carbohydrates and protein.  -Refer to [employer] MyPlate online.   -Walk up to 30 minutes a day.   -Keep a 3 day diet log to review with dietitian.   -Follow up with dietitian as recommended.   -Recommended weight gain with pre-pregnancy BMI <24 would be 25 to 35 lbs. Current weight gain 34 lbs at 32 weeks gestation.   -If dietary modifications are ineffective with maintaining glucose readings within goal; insulin and/or Metformin will be recommended. First line agent would be insulin.   -If anti-diabetes medications are started; increase in fetal surveillance will be recommended.   -Hyperglycemia during  pregnancy is associated with fetal macrosomia; birth injury;  hypoglycemia; polyhydramnios; pre-term labor; pre-eclampsia; ; etc.   -Continue follow up with OB and MFM as recommended.  -Stay in close contact with diabetes team.   Orders:  •  Hemoglobin A1C; Standing  •  Mychart glucose flowsheet    32 weeks gestation of pregnancy    Orders:  •  Hemoglobin A1C; Standing  •  Mychart glucose flowsheet    BMI 30.0-30.9,adult  -Pre-pregnancy weight 130 lbs.  -Current weight 164 lbs.  -TWG 34 lbs.  -Recommended weight gain 25 to 35 lbs.  -Start GDM meal plan and follow up with dietitian.   Orders:  •  Hemoglobin A1C; Standing  •  Mychart glucose flowsheet             Excessive fetal growth affecting management of pregnancy in third trimester, single or unspecified fetus               Encounter provider LUIS Dsouza    The patient was identified by name and date of birth. Miakla Cunha was informed that this is a telemedicine visit and that the visit is being conducted through the Epic Embedded platform. She agrees to proceed..  My office door was closed. No one else was in the room.  She acknowledged consent and understanding of privacy and security of the video platform. The patient has agreed to participate and understands they can discontinue the visit at any time.    Patient is aware this is a billable service.     History of Present Illness {?Quick Links Encounters * My Last Note * Last Note in Specialty * Snapshot * Since Last Visit * History :13164}  Mikala is a 28 yo  female, 32 1/7 weeks gestation with possible macrosomia; reported 1 hour  normal and then due to polyhydramnios and fetal size along with Dexcom CGM readings was presumed to have GDM diagnosis. Dexcom CGM in place. Has not started SMBG yet. CGM shows GMI 6%. Has gained 34 lbs up to date. Reported was eating anything she wanted and now has made dietary modifications. Usually eats 3 meals a day. Wakes up at 7:30 AM and  bedtime 11:30 PM; last snack at 8 PM. Does fast for greater than 10 hours overnight. Needs to be seen by dietitian. Currently does not exercise but walks up 4 flights to her apartment. Encouraged to walk up to 30 minutes a day. Discussed insulin requirements during pregnancy; insulin and Metformin.   Positive fetal movement.     Review of Systems   Constitutional:  Negative for fatigue and fever.   HENT:  Negative for congestion and trouble swallowing.    Eyes:  Negative for visual disturbance.   Respiratory:  Negative for cough and shortness of breath.    Cardiovascular:  Positive for palpitations. Negative for chest pain and leg swelling.   Gastrointestinal:  Positive for constipation.   Endocrine: Positive for polydipsia. Negative for polyphagia and polyuria.   Genitourinary:  Negative for difficulty urinating and vaginal bleeding.   Musculoskeletal:  Negative for back pain.   Skin:  Negative for rash.   Neurological:  Negative for headaches.   Psychiatric/Behavioral:  Negative for sleep disturbance.        Objective {?Quick Links Trend Vitals * Enter New Vitals * Results Review * Timeline (Adult) * Labs * Imaging * Cardiology * Procedures * Lung Cancer Screening * Surgical eConsent :39820}  LMP 04/05/2024   Refer to glucose flowsheet.   CGM Interpretation  Mikala Cunha   Device used Dexcom for Personal Use  Indication: Abnormal glucose during pregnancy.   More than 72 hours of data was reviewed. Report to be scanned to chart.   Date Range: 12/6 to 12/12/2024  Analysis of data:   Average Glucose: 112 mg/dl  Coefficient of Variation: 25.8%  SD : 29 mg/dl  Time in Target Range: 66%  Time Above Range: 32%  Time Below Range: 2%   Interpretation of data:   Goal TIR 70% or higher with minimal hypoglycemia, less than 4% of the time. 34 lbs weight gain may be increasing insulin resistance. Will start GDM meal plan and SMBG. If diet ineffective in achieve pregnancy BG goals; will need insulin and/or Metformin added.     Physical Exam  HENT:      Head: Normocephalic.      Nose: Nose normal.   Eyes:      Conjunctiva/sclera: Conjunctivae normal.   Neurological:      Mental Status: She is alert and oriented to person, place, and time.   Psychiatric:         Mood and Affect: Mood normal.         Behavior: Behavior normal.         Thought Content: Thought content normal.         Judgment: Judgment normal.         Visit Time  Total Visit Duration: 55 minutes with patient and 10 minutes charting.

## 2024-12-12 NOTE — ASSESSMENT & PLAN NOTE
-Pre-pregnancy weight 130 lbs.  -Current weight 164 lbs.  -TWG 34 lbs.  -Recommended weight gain 25 to 35 lbs.  -Start GDM meal plan and follow up with dietitian.   Orders:  •  Hemoglobin A1C; Standing  •  Mychart glucose flowsheet

## 2024-12-12 NOTE — ASSESSMENT & PLAN NOTE
-Dexcom G7 CGM in place  with GMI 6%.  -Check A1c.  -Start self monitoring blood glucose fasting and 2 hours post start of each meal.  -Report via glucose flowsheet every Thursday.   -Glucose goals fasting 60-95 mg/dL; 2 hours post start of each meal 120 mg/dL or less.   -Aim to eat every 2 to 3.5 hours during the day with a combination of carbohydrates and protein per meal/snack.   -Minimum total daily carbohydrates 175 grams paired with half the grams in protein.  -No more than 8 to 10 hours of fasting overnight.  -Always have a bedtime snack with combination of carbohydrates and protein.  -Refer to [employer] Success MyPlate online.   -Walk up to 30 minutes a day.   -Keep a 3 day diet log to review with dietitian.   -Follow up with dietitian as recommended.   -Recommended weight gain with pre-pregnancy BMI <24 would be 25 to 35 lbs. Current weight gain 34 lbs at 32 weeks gestation.   -If dietary modifications are ineffective with maintaining glucose readings within goal; insulin and/or Metformin will be recommended. First line agent would be insulin.   -If anti-diabetes medications are started; increase in fetal surveillance will be recommended.   -Hyperglycemia during pregnancy is associated with fetal macrosomia; birth injury;  hypoglycemia; polyhydramnios; pre-term labor; pre-eclampsia; ; etc.   -Continue follow up with OB and MFM as recommended.  -Stay in close contact with diabetes team.   Orders:  •  Hemoglobin A1C; Standing  •  Mychart glucose flowsheet

## 2024-12-12 NOTE — ASSESSMENT & PLAN NOTE
-Possible macrosomia.  -Maternal weight gain up to date 34 lbs at 32 weeks GA.   -Will start SMBG and follow GDM meal plan.  Orders:  •  Ambulatory Referral to Maternal Fetal Medicine  •  Hemoglobin A1C; Standing  •  Mychart glucose flowsheet

## 2024-12-13 NOTE — PATIENT INSTRUCTIONS
INSULIN EDUCATION    Thank you for attending our insulin education class.     For a quick recap on what you learned today you can review the Basaglar.com educational video for insulin pens. https://www.Testif.The Personal Bee/how-to-use-basaglar    Medications are being recommended to decrease the risk of side effects resulting from hyperglycemia in pregnancy including macrosomia,  hypoglycemia, polyhydramnios, pre-term labor and stillbirth.    Insulin Education:  You will receive 5 pens from the pharmacy  Each insulin pen has 300 units in one pen. Can deliver up to 80 units at one time.   When opening a new pen, remove one insulin pen 15 minutes up to 2 hours before administering to bring insulin to room temperature.   Once you open an insulin pen, it is only good for 28 days at room temperature.   Insulin can be titrated every 3-5 days as needed to control blood sugars.  Insulin doses vary as the pregnancy progresses depending on your weekly blood sugars.     Preparing your pen:  Remove pen cap.  Wipe rubber seal with alcohol wipe. Let dry.  Make sure insulin is clear and colorless. Check expiration date. (Do not use if it's cloudy, colored, or had particles or clumps in it.)  Select a new needle each time. Remove tab, connect and twist on top of insulin pen.   Remove outer and inner needle shields that help protect needle. Keep outer shield to recap your used needle once you are done.     Priming, dosing and injecting:  Perform a 2 unit test dose before each injection. Repeat priming steps until you see insulin at tip of needle. (Do not repeat more than 4 times. If after 4 times you do not see insulin at tip of needle, remove and insert a new needle)   Turn your dose knob to the prescribed units. (Do not count clicks, make sure the number and line matches your prescribed dose)  Choose your site. Remember to rotate injection sites and stay away from stretch marks or scars.   Wipe injection site clean with alcohol and  let dry completely.   Insert needle into your skin, press dose knob and inject slowly. Count to 10 before removing needle from skin.     Clean up:  Replace outer needle shield to cover the needle back up. Unscrew capped needle and throw it away in sharps container.  Make sure your sharp container is: heavy duty plastic, puncture-resistant lid, upright and stable, leak resistant, properly labeled.     Hypoglycemia:  Test your blood sugar at 3:00 am for first 3 mornings following insulin start to monitor for hypoglycemia.   Goal range for 2-3 am:  mg/dL.  Treat low blood sugars <60 or <80 using the 15-15 rule  Always have glucose available for hypoglycemia, use 15 by 15 rule. You can find the 15:15 rule in our GDM booklet.     Scheduling:  Non-stress testing two times weekly and KRANTHI testing beginning at 32 weeks gestation. Call [phone] to schedule if not already scheduled.   Ultrasounds every 4 weeks at the [facility]. Call [phone] to schedule if not already scheduled.     Reporting Blood Sugars:  When adding your blood sugar readings to your glucose flow sheet please ADD UNITS of insulin. This will allow our team see what day you started and when you made any possible adjustments. Important for our team to know in regards to making any changes since it takes 3-5 days to see a difference. We would appreciate this and thank you for all you do to communicate with our team.     Blood Sugar Ranges:  Fastin-90 mg/dL   Before meals:  mg/dL  1 hour after the start of each meal: 140 mg/dL or <   2 hours after start of each meal: 120 mg/dL or <  2-3 am:  mg/dL    Continue Recommendations:  Testing Blood Sugars: 4 times daily (fasting and 1 or 2hrs after the start of each meal) - as instructed    Reporting Blood Sugars: via [place] Glucose Flowsheet on a weekly basis until you deliver.  Meal Plan: 3 meals and 3 snacks daily.   Eating every 2 to 3.5 hours during the day.    Be sure to have bedtime snack that includes at least 15 grams of carbohydrates and 2 to 3 servings of protein.  Minimum of total carbohydrates of 175 grams daily.   Carbohydrates always need to be paired with protein.   Physical Activity: If ok by your OB try adding a 20-30 minute walk in evening after dinner to help keep fasting glucose within range.  Continue follow up with OB and MFM as recommended.  Stay in close contact with diabetes education team.   While sick or feeling ill, follow our sick day guidelines in our GDM booklet.   For travel and dining out tips refer to our GDM booklet.    If you have any questions or concerns, please do not hesitate to call us at [phone].      Rosalia Schwab RD   Diabetes Educator  The Diabetes and Pregnancy Program   at [facility] - Maternal Fetal Medicine Thank you for choosing us for your  care today.  If you have any questions about your ultrasound or care, please do not hesitate to contact us or your primary obstetrician.        Some general instructions for your pregnancy are:    Exercise: Aim for 150 minutes per week of regular exercise.  Walking is great!  Nutrition: Choose healthy sources of calcium, iron, and protein.  Avoid ultraprocessed foods and added sugar.  Learn about Preeclampsia: preeclampsia is a common, potentially serious high blood pressure complication in pregnancy.  A blood pressure of 140mmHg (systolic or top number) or 90mmHg (diastolic or bottom number) should be evaluated by your doctor.  Aspirin is sometimes prescribed in early pregnancy to prevent preeclampsia in women with risk factors - ask your obstetrician if you should be on this medication.  For more resources, visit:  https://www.highriskpregnancyinfo.org/preeclampsia  If you smoke, please try to quit completely but also try to reduce your smoking by as much as possible (as soon as possible).  Do not vape.  Please also avoid cannabis products.  Other  warning signs to watch out for in pregnancy or postpartum: chest pain, obstructed breathing or shortness of breath, seizures, thoughts of hurting yourself or your baby, bleeding, a painful or swollen leg, fever, or headache (see [facility] POST-BIRTH Warning Signs campaign).  If these happen call 911.  Itching is also not normal in pregnancy and if you experience this, especially over your hands and feet, potentially worse at night, notify your doctors.

## 2024-12-16 ENCOUNTER — ROUTINE PRENATAL (OUTPATIENT)
Dept: OBGYN CLINIC | Facility: CLINIC | Age: 29
End: 2024-12-16
Payer: COMMERCIAL

## 2024-12-16 VITALS
SYSTOLIC BLOOD PRESSURE: 122 MMHG | BODY MASS INDEX: 30.18 KG/M2 | HEIGHT: 62 IN | WEIGHT: 164 LBS | DIASTOLIC BLOOD PRESSURE: 84 MMHG

## 2024-12-16 DIAGNOSIS — O21.9 NAUSEA/VOMITING IN PREGNANCY: Primary | ICD-10-CM

## 2024-12-16 DIAGNOSIS — O99.810 ABNORMAL GLUCOSE COMPLICATING PREGNANCY: ICD-10-CM

## 2024-12-16 DIAGNOSIS — Z3A.32 32 WEEKS GESTATION OF PREGNANCY: ICD-10-CM

## 2024-12-16 DIAGNOSIS — Z29.11 NEED FOR RSV IMMUNIZATION: ICD-10-CM

## 2024-12-16 DIAGNOSIS — O36.63X0 EXCESSIVE FETAL GROWTH AFFECTING MANAGEMENT OF PREGNANCY IN THIRD TRIMESTER, SINGLE OR UNSPECIFIED FETUS: ICD-10-CM

## 2024-12-16 DIAGNOSIS — D25.9 UTERINE FIBROID IN PREGNANCY: ICD-10-CM

## 2024-12-16 DIAGNOSIS — O99.019 ANEMIA DURING PREGNANCY: ICD-10-CM

## 2024-12-16 DIAGNOSIS — O34.10 UTERINE FIBROID IN PREGNANCY: ICD-10-CM

## 2024-12-16 PROCEDURE — PNV: Performed by: OBSTETRICS & GYNECOLOGY

## 2024-12-16 PROCEDURE — 90678 RSV VACC PREF BIVALENT IM: CPT | Performed by: OBSTETRICS & GYNECOLOGY

## 2024-12-16 PROCEDURE — 90471 IMMUNIZATION ADMIN: CPT | Performed by: OBSTETRICS & GYNECOLOGY

## 2024-12-16 RX ORDER — ONDANSETRON 4 MG/1
4 TABLET, FILM COATED ORAL EVERY 8 HOURS PRN
Qty: 30 TABLET | Refills: 0 | Status: SHIPPED | OUTPATIENT
Start: 2024-12-16

## 2024-12-16 NOTE — PROGRESS NOTES
OB/GYN  PN Visit  Mikala Cunha  [id number]  2024  3:14 PM  Dr. Sherine Fregoso MD    S: 29 y.o.  32w5d here for PN visit. She denies contractions. She denies leakage of fluid and vaginal bleeding. She reports good fetal movement. She reports daily nausea but denies vomiting. She reports occasional mild headaches. She denies cramping, edema, domestic violence, and smoking. Her pregnancy is complicated by late transfer of care, possible macrosomia, GDM and uterine fibroids.    O:  Pre-Myrna Vitals      Flowsheet Row Most Recent Value   Prenatal Assessment    Fetal Heart Rate 158   Fundal Height (cm) 34 cm   Movement Present   Prenatal Vitals    Blood Pressure 122/84   Weight - Scale 74.4 kg (164 lb)   Urine Albumin/Glucose    Dilation/Effacement/Station    Vaginal Drainage    Draining Fluid No   Edema    LLE Edema None   RLE Edema None          Physical Exam  Vitals reviewed.   Constitutional:       General: She is not in acute distress.     Appearance: Normal appearance. She is well-developed. She is not ill-appearing, toxic-appearing or diaphoretic.   Cardiovascular:      Rate and Rhythm: Normal rate.   Pulmonary:      Effort: Pulmonary effort is normal. No respiratory distress.   Abdominal:      General: There is no distension.      Palpations: Abdomen is soft. There is no mass.      Tenderness: There is no abdominal tenderness. There is no guarding or rebound.   Genitourinary:     Comments: Gravid, nontender  Skin:     General: Skin is warm and dry.   Neurological:      Mental Status: She is alert and oriented to person, place, and time.   Psychiatric:         Mood and Affect: Mood normal.         Behavior: Behavior normal.         A/P:    Assessment & Plan  32 weeks gestation of pregnancy  - Continue PNV  - Labor precautions reviewed  - Fetal kick counts reviewed  - Labs: UTD  - Genetics: neg CF and SMN carrier, normal NIPS   - Ultrasounds: Most recent US 24 showed possible macrosomia  with EFW 95% and AC >99%; Next US scheduled for 1/10/25  - Tdap: Done 11/15/24   - Flu Shot: Declined  - RSV: Administered today   - COVID: Unvaccinated  - Rhogam: N/A  - Delivery:  with epidural  - Contraception: None  - Breastfeeding: Yes; breast; has a pump at home  - Pediatrician: Planning to establish with her peds office  - RTO in 2 weeks       Abnormal glucose complicating pregnancy  Continue to follow with diabetes education  If 20% or more FBS are >95 or 2 hr pp are >120 she should be started on insulin or a oral hypoglycemic such as metformin or glyburide.   If medications are required to control her diabetes, she will require twice weekly fetal testing with NST's starting at 32 weeks. Delivery would be recommended around her due date.  If she does not require any medications to control her diabetes then she can start fetal testing at 40 weeks and be delivered by 41 weeks.   Postnatally after delivery, most patients with gestational diabetes can stop their insulin and gestational diabetes diet. Recommend she complete a 2 hour glucose tolerance test at 6 weeks postpartum  Patients with gestational diabetes have a higher risk for developing overt diabetes in the future.  Recommend she be screened for diabetes yearly.       Excessive fetal growth affecting management of pregnancy in third trimester, single or unspecified fetus  US 24  MEASUREMENTS (* Included In Average GA)     AC             31.87 cm        35 weeks 6 days* (>99%)  BPD             8.09 cm        32 weeks 4 days* (56%)  HC             29.95 cm        33 weeks 1 day * (44%)  Femur           6.12 cm        31 weeks 6 days* (31%)     EFW [place] 4   2354 grams - 5 lbs 3 oz                 (94%)  Next growth scans scheduled for 1/10/25       Uterine fibroid in pregnancy    1. Subserosal myometrium fibroid located in the left lateral corpus  region, measuring 4.0 x 3.4 x 3.9cm with a volume of 27.4cc. Color doppler flow was not increased.  The appearance was heterogeneous.     2. Subserosal myometrium fibroid located in the middle anterior corpus region, measuring 2.3 x 2.0 x 2.2cm with a volume of 5.5cc. Color doppler flow was not increased. The appearance was heterogeneous.       Nausea/vomiting in pregnancy  Zofran sent to her pharmacy  Orders:    ondansetron (ZOFRAN) 4 mg tablet; Take 1 tablet (4 mg total) by mouth every 8 (eight) hours as needed for nausea or vomiting    Need for RSV immunization    Orders:    Respiratory Syncytial Virus (RSV) vaccine (recombinant) (Abrysvo)    Anemia during pregnancy  Continue iron supplementation              Future Appointments   Date Time Provider Department Center   2024  2:00 PM  [facility] [place] [facility]   2024  2:30 PM  [facility] [place] [facility]   2024  2:30 PM  [facility] [place] [facility]   2025  2:30 PM LUIS Walsh [facility]   2025  2:15 PM Padma Ospina MD [facility]   1/10/2025 11:00 AM  [facility] [place] [facility]   2025  3:15 PM Alexandra Mcgrath MD [facility]   2025  3:15 PM  [facility] [place] [facility]   2025  2:30 PM Sherine Fregoso MD [facility]   2025  1:45 PM Kaitlyn Perez PA-C [facility]   2025  2:00 PM Alix Barger MD [facility]         Sherine Fregoso MD  2024  7:35 PM

## 2024-12-18 ENCOUNTER — OFFICE VISIT (OUTPATIENT)
Dept: PERINATAL CARE | Facility: CLINIC | Age: 29
End: 2024-12-18
Payer: COMMERCIAL

## 2024-12-18 VITALS
SYSTOLIC BLOOD PRESSURE: 106 MMHG | DIASTOLIC BLOOD PRESSURE: 68 MMHG | WEIGHT: 166.8 LBS | OXYGEN SATURATION: 98 % | HEART RATE: 84 BPM | BODY MASS INDEX: 30.69 KG/M2 | HEIGHT: 62 IN

## 2024-12-18 DIAGNOSIS — Z71.89 ENCOUNTER FOR INJECTION EDUCATION: ICD-10-CM

## 2024-12-18 DIAGNOSIS — O99.810 ABNORMAL GLUCOSE COMPLICATING PREGNANCY: Primary | ICD-10-CM

## 2024-12-18 DIAGNOSIS — Z3A.33 33 WEEKS GESTATION OF PREGNANCY: ICD-10-CM

## 2024-12-18 DIAGNOSIS — Z97.8 USES SELF-APPLIED CONTINUOUS GLUCOSE MONITORING DEVICE: ICD-10-CM

## 2024-12-18 PROCEDURE — G0108 DIAB MANAGE TRN  PER INDIV: HCPCS

## 2024-12-18 NOTE — PROGRESS NOTES
"CLASS 2 - {Individual or Group:50076}  ({virtual/inperson:13854})    Thank you for referring your patient to [facility] Maternal Fetal Medicine Diabetes and Pregnancy Program.     Mikala Cunha is a  29 y.o. female who presents today {ALONE OR FAMILY - EC:47742} for Consult (DIP).  Patient is at 33w0d gestation, Estimated Date of Delivery: 25.     Visit Diagnosis:  Encounter Diagnosis     ICD-10-CM    1. Abnormal glucose complicating pregnancy  O99.810       2. 33 weeks gestation of pregnancy  Z3A.33            Reviewed and updated the following from patients medical record: PMH, Problem List, Allergies, and Current Medications.    Labs  GDM LABS: See Class 1 Note    A1C:  Lab Results   Component Value Date/Time    HGBA1C 5.3 2023 09:00 AM        Labs Ordered This Visit: {LABS:58227::\"None\"}    Current Medications:    Current Outpatient Medications:     albuterol (PROVENTIL HFA,VENTOLIN HFA) 90 mcg/act inhaler, Inhale 2 puffs every 6 (six) hours as needed, Disp: , Rfl:     Ascorbic Acid (vitamin C) 100 MG tablet, Take 100 mg by mouth daily, Disp: , Rfl:     Blood Glucose Monitoring Suppl (OneTouch Verio Reflect) w/Device KIT, TESTING BLOOD SUGARS 4 X DAILY, Disp: , Rfl:     Continuous Glucose Sensor (Dexcom G7 Sensor), , Disp: , Rfl:     Lancets (OneTouch Delica Plus [id number]) MISC, TESTING BLOOD SUGARS 4 X DAILY, Disp: , Rfl:     levocetirizine (XYZAL) 5 MG tablet, Take 5 mg by mouth every evening, Disp: , Rfl:     montelukast (SINGULAIR) 10 mg tablet, Take 10 mg by mouth every evening, Disp: , Rfl:     ondansetron (ZOFRAN) 4 mg tablet, Take 1 tablet (4 mg total) by mouth every 8 (eight) hours as needed for nausea or vomiting, Disp: 30 tablet, Rfl: 0    Prenatal Multivit-Min-Fe-FA (PRE- PO), Take by mouth, Disp: , Rfl:     Ferrous Sulfate (IRON PO), Take 1 tablet by mouth daily, Disp: , Rfl:     OneTouch Verio test strip, , Disp: , Rfl:      Anthropometrics:  Ht Readings from Last 1 Encounters: " "  24 5' 2\" (1.575 m)      Wt Readings from Last 3 Encounters:   24 75.7 kg (166 lb 12.8 oz)   24 74.4 kg (164 lb)   24 74.7 kg (164 lb 9.6 oz)        Pre-Gravid Wt Pre-Gravid BMI TWG   Pregravid weight not on file Could not be calculated Not found.     Total Pregnancy Weight Gain Recommendations: {[facility] WT Gain Recommendations:73047}  Current Wt Status Compared to Recommendations: {gdmwtgain:65320}    Most Recent Ultrasound Results:  Findings: {Ultrasound Findings:86678}  Further Fetal Surveillance: {OB Recs for Further Fetal Monitorin::\"None\"}  Next US date: {Scheduled?:07146::\"Scheduled Appropriately\"}    BLOOD GLUCOSE MONITORING:   Glucometer: {[facility] Glucose Meter:93796}     Reinforced at Today's Visit:   Timing/Frequency of SMBG: {[facility] Blood Sugar Testin}  Goals: (Fasting) {60-90 or 95:44300::\"60-95mg/dL\"} // {1hr or 2hr PP Goal:12254::\"(2hr PP) <120mg/dL\"}  Reporting Guidelines: Weekly via Phone: [phone] OR My Chart (Message with image attachment) OR Glucose Flowsheet  Method of Reporting: {Method of Reportin}    BG LOG:           Review of Blood Glucose Log:   Indicates {DESC; POOR/FAIR/GOOD/EXCELLENT:59136} glycemic control  FBG = {well controlled/ not well controlled:39980}  Post-Prandial BG = {well controlled/ not well controlled:64654}    MEAL PLAN (Patient was provided with a meal plan including 3 meals and 3 snacks at class 1)  *Calories: {[facility] Meal Plans:76929}    Review of Patient's Current Diet: refer to class 1 note for additional details    Breakfast: ***  AM Snack: ***  Lunch: ***  PM Snack: ***  Dinner: ***  Bedtime Snack(***): ***    Beverages: {Sugar-Sweetened Beverages?:28008}  Dining Out Frequency: {Frequency:86235}    Meal Plan Recommendations Compliant? Comments:    Consistent CHO Intake {YES/NO:70501}     3 Meals and 3 Snacks {YES/NO:20945}     Protein w/ Every Meal and Snack {YES/NO:62508}     Eating every 2-3.5hrs while awake  {YES/NO:76958}   "   8-10hrs Fasting (from time of bedtime snack until first meal of the day) {YES/NO:39003}  {Comment:42182}     Overall Impression: Pt has a {DESC; GOOD/FAIR/POOR:79052} compliance and understanding of diet recommendations at this time.    Reinforced Diet Instructions:  Individualized meal plan.   Importance of consistent carbohydrate intake via 3 meals and 3 snacks per day   Importance of protein as it relates to blood glucose control.   Encouraged  patient to eat every 2.0-3.5 hours while awake  Encouraged patient to go no longer than 8-10 hours fasting overnight until first meal of the day.  Provided suggested meal/snack options to increase nutrition and maintain consistent meal and snack intakes.    Physical Activity:  Currently physically active? {Exercise:68948}    Reviewed w/ Pt:   Benefits of physical activity to optimize blood glucose control, encouraged activity at patient is physically able.   Instructed pt to always consult a physician prior to starting an exercise program.   Recommend 20-30 minutes daily.    Additional Topics Reviewed:    Medications: (reviewed options available with pt)  Discussed if blood sugars are not within normal range with meal planning and exercise  Reviewed medication such as metformin and/or basal/bolus insulin may be needed for better glucose control  Maternal-Fetal Testing:   Ultrasounds: growth scans every 4 weeks.  NST: twice weekly starting at 32nd week GA  KRANTHI:  weekly starting at 32 weeks GA  Sick day Guidelines:   Advised that sickness will raise blood sugar   If blood sugar is > 160 mg/dL twice in one day call doctor  If on diabetes medications, continue as instructed   If unable to consume normal meal plan, instructed to remain well hydrated   Hypoglycemia & Treatment Guidelines:  Reviewed what hypoglycemia is, signs and symptoms, and how to treat via the 15:15 rule.  Post-Partum Guidelines:  Completion of 75 gm CHO 2 hr gtt at 6 weeks post-partum to check for Type 2  "DM diagnosis  Breastfeeding Guidelines:  Continue GDM meal plan plus additional 350-500 calories daily  Examples of protein and carbohydrate snacks provided.  Stay hydrated by drinking 8-10 (8 oz.) fluids daily.  Dining Out & Travel Guidelines:  Patient advised to be prepared with extra diabetes supplies, medications, and snacks, as well as sticking to the same time schedule and portions eaten at home for meals and snacks.      INSULIN EDUCATION    The patient was instructed on the following:  Side effects of hyperglycemia in pregnancy including macrosomia,  hypoglycemia, polyhydramnios, pre-term labor and stillbirth.  Insulin Administration Time: Once daily at bedtime  Insulin Action: Approx. 24hours   How to Inject Insulin  Injection Sites     Monitoring:  Hypoglycemia signs, symptoms and treatment.   Advised patient to test blood sugar at 3:00 am for first 3 mornings following insulin start to monitor for hypoglycemia  Input in \"Night-time Glucose\" on [employer] Glucose Flowsheet  Increase in maternal-fetal surveillance with insulin initiation.  Non-stress testing twice weekly and KRANTHI once weekly beginning at 32 weeks gestation.        Continue ultrasounds every 4 weeks at the [facility]  Continue to test blood sugars 4 time daily:   Fasting (goal 60 mg/dl to 90 mg/dl)  2hrs After the START of each meal (goal less than 120 mg/dl).  Labs: HbA1c every 6 to 8 weeks    Reporting Blood Sugars:   Diabetes team will continue to review blood sugars once weekly till delivery  Pt instructed to message diabetes team via [employer] message every 3-5 days for insulin adjustment if fasting blood sugars remain >90      Patient Stated Goal: {[facility] PT STATED GOAL:04483}  Goal Assessment: {[facility] GOAL ASSESSMENT:48645}    Diabetes Self Management Support Plan outside of ongoing care: {D&P DSMT Support Plan:43255}    Barriers to Learning/Change: {[facility] VARIABLES AFFECTING LEARNIN}  Expected Compliance: " {PATIENT'S RESPONSE EXPECTED COMPLIANCE:7214839329}    Date to report blood sugars: Weekly   Follow up:  No follow-ups on file.     Begin Time: ***  End Time: ***    It was a pleasure working with them today. Please feel free to call [phone]) with any questions or concerns.    Rosalia Schwab RD   Diabetes Educator  [facility] Maternal Fetal Medicine  Diabetes and Pregnancy Program  [address], [address]  [place] [address]

## 2024-12-19 DIAGNOSIS — Z3A.33 33 WEEKS GESTATION OF PREGNANCY: ICD-10-CM

## 2024-12-19 DIAGNOSIS — O99.810 ABNORMAL GLUCOSE COMPLICATING PREGNANCY: Primary | ICD-10-CM

## 2024-12-19 PROBLEM — R00.2 PALPITATIONS: Status: RESOLVED | Noted: 2024-12-11 | Resolved: 2024-12-19

## 2024-12-19 PROBLEM — R10.2 PELVIC PAIN: Status: RESOLVED | Noted: 2024-03-11 | Resolved: 2024-12-19

## 2024-12-19 PROBLEM — Z31.69 ENCOUNTER FOR INFERTILITY COUNSELING: Status: RESOLVED | Noted: 2022-09-06 | Resolved: 2024-12-19

## 2024-12-19 PROBLEM — N92.6 MISSED PERIOD: Status: RESOLVED | Noted: 2022-09-06 | Resolved: 2024-12-19

## 2024-12-19 RX ORDER — INSULIN GLARGINE 100 [IU]/ML
INJECTION, SOLUTION SUBCUTANEOUS
Qty: 15 ML | Refills: 0 | Status: SHIPPED | OUTPATIENT
Start: 2024-12-19 | End: 2025-02-05

## 2024-12-19 NOTE — TELEPHONE ENCOUNTER
Patient called to follow up .      She is aware once approved by Doctor as dicussed at her visit yesterday the insulin will be sent to Pharmacy.

## 2024-12-19 NOTE — PROGRESS NOTES
CLASS 2 - Individual  (in-person office visit )    Thank you for referring your patient to [facility] Maternal Fetal Medicine Diabetes and Pregnancy Program.     Mikala Cunha is a  29 y.o. female who presents today with spouse for Gestational Diabetes and Patient Education.  Patient is at 33w1d gestation, Estimated Date of Delivery: 25.     Visit Diagnosis:  Encounter Diagnosis     ICD-10-CM    1. Abnormal glucose complicating pregnancy  O99.810       2. 33 weeks gestation of pregnancy  Z3A.33       3. Encounter for injection education  Z71.89       4. Uses self-applied continuous glucose monitoring device  Z97.8          Reviewed and updated the following from patients medical record: PMH, Problem List, Allergies, and Current Medications.    Labs  GDM LABS: See Class 1 Note    A1C:  Lab Results   Component Value Date/Time    HGBA1C 5.3 2023 09:00 AM      Labs Ordered This Visit: None - encouraged to complete A1c at earliest convenience    Current Medications:    Current Outpatient Medications:     albuterol (PROVENTIL HFA,VENTOLIN HFA) 90 mcg/act inhaler, Inhale 2 puffs every 6 (six) hours as needed, Disp: , Rfl:     Ascorbic Acid (vitamin C) 100 MG tablet, Take 100 mg by mouth daily, Disp: , Rfl:     Blood Glucose Monitoring Suppl (OneTouch Verio Reflect) w/Device KIT, TESTING BLOOD SUGARS 4 X DAILY, Disp: , Rfl:     Continuous Glucose Sensor (Dexcom G7 Sensor), , Disp: , Rfl:     Lancets (OneTouch Delica Plus [id number]) MISC, TESTING BLOOD SUGARS 4 X DAILY, Disp: , Rfl:     levocetirizine (XYZAL) 5 MG tablet, Take 5 mg by mouth every evening, Disp: , Rfl:     montelukast (SINGULAIR) 10 mg tablet, Take 10 mg by mouth every evening, Disp: , Rfl:     ondansetron (ZOFRAN) 4 mg tablet, Take 1 tablet (4 mg total) by mouth every 8 (eight) hours as needed for nausea or vomiting, Disp: 30 tablet, Rfl: 0    Prenatal Multivit-Min-Fe-FA (PRE- PO), Take by mouth, Disp: , Rfl:     Ferrous Sulfate (IRON PO),  "Take 1 tablet by mouth daily, Disp: , Rfl:     OneTouch Verio test strip, , Disp: , Rfl:      Anthropometrics:  Ht Readings from Last 1 Encounters:   24 5' 2\" (1.575 m)      Wt Readings from Last 3 Encounters:   24 75.7 kg (166 lb 12.8 oz)   24 74.4 kg (164 lb)   24 74.7 kg (164 lb 9.6 oz)        Pre-Gravid Wt Pre-Gravid BMI TWG   59 kg (130 lb) 23.77 16.7 kg (36 lb 12.8 oz)     Total Pregnancy Weight Gain Recommendations: BMI (18.5-24.9) 25-35 lbs  Current Wt Status Compared to Recommendations: Exceeding -- Recommended to maintain wt for remainder of pregnancy    Most Recent Ultrasound Results:  Findings:  (24) AC: >99%, EFW: 94%, NML KRANTHI  Further Fetal Surveillance: Yes, NST twice weekly and KRANTHI once weekly starting at 32wk GA d/t insulin controlled GDM  Next US date: Scheduled Appropriately    BLOOD GLUCOSE MONITORING:   Glucometer: OneTouch Verio Flex     Reinforced at Today's Visit:   Timing/Frequency of SMB x per day (Fasting, 2 hour after start of each meal)  Goals: (Fasting) 60-90mg/dL // (2hr PP) <120mg/dL  Reporting Guidelines: Weekly via Phone: [phone] OR My Chart (Message with image attachment) OR Glucose Flowsheet  Method of Reporting: [employer] Glucose Flowsheet + Dexcom    BG LOG: No BG Reported via [employer] Glucose Flowsheet. Pt reports she is not completing fingersticks consistently. Has completed a new random fingersticks which she mentions were very close to the Dexcom readings.    Dexcom Report:         Reporting period: Fri Dec 13, 2024 - Thu Dec 19, 2024  -----------------------------  Glucose Details  Average glucose: 110 mg/dL  Standard deviation: 29 mg/dL  GMI: N/A  -----------------------------  Time in Range  Very High: 5%  High: 28%  In Range: 64%  Low: 1%  Very Low: 2%    Target Range  Day (7:30 AM - 11:30 PM):  mg/dL  Night (11:30 PM - 7:30 AM):  mg/dL  -----------------------------  CGM Details  Sensor usage: 86%  Days with CGM data: " 6/7    Review of Blood Glucose Log:   Inconsistently elevated blood sugars.   Dexcom report reviewed with Kanchan Jackson and Dr. Morocho  Hypoglycemia likely inaccurate on Dexcom report per Kanchan Jackson. Pt reports no symptoms of low blood sugars. Not confirmed with fingersticks.    MEAL PLAN (Patient was provided with a meal plan including 3 meals and 3 snacks at class 1)  *Calories: 1800 calorie (CHO: 45-15-45-15-45-30) (PRO:2-1-3-1-3-2)    Review of Patient's Current Diet: Pt did not yet begin following diet instructions.    Reinforced Diet Instructions:  Individualized meal plan.   Importance of consistent carbohydrate intake via 3 meals and 3 snacks per day   Importance of protein as it relates to blood glucose control.   Encouraged  patient to eat every 2.0-3.5 hours while awake  Encouraged patient to go no longer than 8-10 hours fasting overnight until first meal of the day.  Provided suggested meal/snack options to increase nutrition and maintain consistent meal and snack intakes.    Physical Activity:    Reviewed w/ Pt:   Benefits of physical activity to optimize blood glucose control, encouraged activity at patient is physically able.   Instructed pt to always consult a physician prior to starting an exercise program.   Recommend 20-30 minutes daily.    Additional Topics Reviewed:    Medications: (reviewed options available with pt)  Discussed if blood sugars are not within normal range with meal planning and exercise  Reviewed medication such as metformin and/or basal/bolus insulin may be needed for better glucose control  Maternal-Fetal Testing:   Ultrasounds: growth scans every 4 weeks.  NST: twice weekly starting at 32nd week GA  KRANTHI:  weekly starting at 32 weeks GA  Sick day Guidelines:   Advised that sickness will raise blood sugar   If blood sugar is > 160 mg/dL twice in one day call doctor  If on diabetes medications, continue as instructed   If unable to consume normal meal plan, instructed to remain well  "hydrated   Hypoglycemia & Treatment Guidelines:  Reviewed what hypoglycemia is, signs and symptoms, and how to treat via the 15:15 rule.  Post-Partum Guidelines:  Completion of 75 gm CHO 2 hr gtt at 6 weeks post-partum to check for Type 2 DM diagnosis  Breastfeeding Guidelines:  Continue GDM meal plan plus additional 350-500 calories daily  Examples of protein and carbohydrate snacks provided.  Stay hydrated by drinking 8-10 (8 oz.) fluids daily.  Dining Out & Travel Guidelines:  Patient advised to be prepared with extra diabetes supplies, medications, and snacks, as well as sticking to the same time schedule and portions eaten at home for meals and snacks.    INSULIN EDUCATION - Due to fasting blood sugars >90, Begin 10 units of Lantus once daily at bedtime (9-10pm)     The patient was instructed on the following:  Side effects of hyperglycemia in pregnancy including macrosomia,  hypoglycemia, polyhydramnios, pre-term labor and stillbirth.  Insulin Administration Time: Once daily at bedtime  Insulin Action: Approx. 24hours   How to Inject Insulin  Injection Sites     Monitoring:  Hypoglycemia signs, symptoms and treatment.   Advised patient to test blood sugar at 3:00 am for first 3 mornings following insulin start to monitor for hypoglycemia  Input in \"Night-time Glucose\" on [employer] Glucose Flowsheet  Increase in maternal-fetal surveillance with insulin initiation.  Non-stress testing twice weekly and KRANTHI once weekly beginning at 32 weeks gestation.        Continue ultrasounds every 4 weeks at the [facility] Maternal Fetal Medicine  Continue to test blood sugars 4 time daily:   Fasting (goal 60 mg/dl to 90 mg/dl)  2hrs After the START of each meal (goal less than 120 mg/dl).  Labs: HbA1c every 6 to 8 weeks    Reporting Blood Sugars:   Diabetes team will continue to review blood sugars once weekly till delivery  Pt instructed to message diabetes team via [employer] message every 3-5 days for insulin adjustment " if fasting blood sugars remain >90    Diabetes Self Management Support Plan outside of ongoing care: Spouse/Family    Barriers to Learning/Change: No Barriers  Expected Compliance: good    Date to report blood sugars: Weekly   Follow up:  Return for per review of weekly blood sugar log.     Begin Time: 1:00pm  End Time: 2:00pm    It was a pleasure working with them today. Please feel free to call [phone]) with any questions or concerns.    Rosalia Schwab RD   Diabetes Educator  [facility] Maternal Fetal Medicine  Diabetes and Pregnancy Program  [address], [address]  [place] [address]

## 2024-12-20 ENCOUNTER — TELEPHONE (OUTPATIENT)
Age: 29
End: 2024-12-20

## 2024-12-20 NOTE — ASSESSMENT & PLAN NOTE
1. Subserosal myometrium fibroid located in the left lateral corpus  region, measuring 4.0 x 3.4 x 3.9cm with a volume of 27.4cc. Color doppler flow was not increased. The appearance was heterogeneous.     2. Subserosal myometrium fibroid located in the middle anterior corpus region, measuring 2.3 x 2.0 x 2.2cm with a volume of 5.5cc. Color doppler flow was not increased. The appearance was heterogeneous.

## 2024-12-20 NOTE — ASSESSMENT & PLAN NOTE
- Continue PNV  - Labor precautions reviewed  - Fetal kick counts reviewed  - Labs: UTD  - Genetics: neg CF and SMN carrier, normal NIPS   - Ultrasounds: Most recent US 24 showed possible macrosomia with EFW 95% and AC >99%; Next US scheduled for 1/10/25  - Tdap: Done 11/15/24   - Flu Shot: Declined  - RSV: Administered today   - COVID: Unvaccinated  - Rhogam: N/A  - Delivery:  with epidural  - Contraception: None  - Breastfeeding: Yes; breast; has a pump at home  - Pediatrician: Planning to establish with her peds office  - RTO in 2 weeks

## 2024-12-20 NOTE — ASSESSMENT & PLAN NOTE
Continue to follow with diabetes education  If 20% or more FBS are >95 or 2 hr pp are >120 she should be started on insulin or a oral hypoglycemic such as metformin or glyburide.   If medications are required to control her diabetes, she will require twice weekly fetal testing with NST's starting at 32 weeks. Delivery would be recommended around her due date.  If she does not require any medications to control her diabetes then she can start fetal testing at 40 weeks and be delivered by 41 weeks.   Postnatally after delivery, most patients with gestational diabetes can stop their insulin and gestational diabetes diet. Recommend she complete a 2 hour glucose tolerance test at 6 weeks postpartum  Patients with gestational diabetes have a higher risk for developing overt diabetes in the future.  Recommend she be screened for diabetes yearly.

## 2024-12-20 NOTE — ASSESSMENT & PLAN NOTE
US 12/11/24  MEASUREMENTS (* Included In Average GA)     AC             31.87 cm        35 weeks 6 days* (>99%)  BPD             8.09 cm        32 weeks 4 days* (56%)  HC             29.95 cm        33 weeks 1 day * (44%)  Femur           6.12 cm        31 weeks 6 days* (31%)     EFW [place] 4   2354 grams - 5 lbs 3 oz                 (94%)  Next growth scans scheduled for 1/10/25

## 2024-12-22 PROBLEM — O24.419 GDM, CLASS A2: Status: ACTIVE | Noted: 2024-12-12

## 2024-12-23 NOTE — PATIENT INSTRUCTIONS
Thank you for choosing us for your  care today.  If you have any questions about your ultrasound or care, please do not hesitate to contact us or your primary obstetrician.        Some general instructions for your pregnancy are:    Exercise: Aim for 150 minutes per week of regular exercise.  Walking is great!  Nutrition: Choose healthy sources of calcium, iron, and protein.  Avoid ultraprocessed foods and added sugar.  Learn about Preeclampsia: preeclampsia is a common, potentially serious high blood pressure complication in pregnancy.  A blood pressure of 140mmHg (systolic or top number) or 90mmHg (diastolic or bottom number) should be evaluated by your doctor.  Aspirin is sometimes prescribed in early pregnancy to prevent preeclampsia in women with risk factors - ask your obstetrician if you should be on this medication.  For more resources, visit:  https://www.highriskpregnancyinfo.org/preeclampsia  If you smoke, please try to quit completely but also try to reduce your smoking by as much as possible (as soon as possible).  Do not vape.  Please also avoid cannabis products.  Other warning signs to watch out for in pregnancy or postpartum: chest pain, obstructed breathing or shortness of breath, seizures, thoughts of hurting yourself or your baby, bleeding, a painful or swollen leg, fever, or headache (see [facility] Sign[facility]).  If these happen call 911.  Itching is also not normal in pregnancy and if you experience this, especially over your hands and feet, potentially worse at night, notify your doctors.     Kick Counts in Pregnancy   AMBULATORY CARE:   Kick counts  measure how much your baby is moving in your womb. A kick from your baby can be felt as a twist, turn, swish, roll, or jab. It is common to feel your baby kicking at 26 to 28 weeks of pregnancy. You may feel your baby kick as early as 20 weeks of pregnancy. You may want to start counting at 28 weeks.   Contact your  doctor immediately if:   You feel a change in the number of kicks or movements of your baby.      You feel fewer than 10 kicks within 2 hours.      You have questions or concerns about your baby's movements.     Why measure kick counts:  Your baby's movement may provide information about your baby's health. He or she may move less, or not at all, if there are problems. Your baby may move less if he or she is not getting enough oxygen or nutrition from the placenta. Do not smoke while you are pregnant. Smoking decreases the amount of oxygen that gets to your baby. Talk to your healthcare provider if you need help to quit smoking. Tell your healthcare provider as soon as you feel a change in your baby's movements.  When to measure kick counts:   Measure kick counts at the same time every day.       Measure kick counts when your baby is awake and most active. Your baby may be most active in the evening.     How to measure kick counts:  Check that your baby is awake before you measure kick counts. You can wake up your baby by lightly pushing on your belly, walking, or drinking something cold. Your healthcare provider may tell you different ways to measure kick counts. You may be told to do the following:  Use a chart or clock to keep track of the time you start and finish counting.      Sit in a chair or lie on your left side.      Place your hands on the largest part of your belly.      Count until you reach 10 kicks. Write down how much time it takes to count 10 kicks.      It may take 30 minutes to 2 hours to count 10 kicks. It should not take more than 2 hours to count 10 kicks.     Follow up with your doctor as directed:  Write down your questions so you remember to ask them during your visits.   © Copyright [employer] 2023 Information is for End User's use only and may not be sold, redistributed or otherwise used for commercial purposes.  The above information is an  only. It is not intended as  medical advice for individual conditions or treatments. Talk to your doctor, nurse or pharmacist before following any medical regimen to see if it is safe and effective for you. Nonstress Test for Pregnancy   WHAT YOU NEED TO KNOW:   What do I need to know about a nonstress test?  A nonstress test measures your baby's heart rate and movements. Nonstress means that no stress will be placed on your baby during the test.  How do I prepare for a nonstress test?  Your healthcare provider will talk to you about how to prepare for this test. Your provider may tell you to eat and drink plenty of liquids before your test. If you smoke, you may be asked not to smoke within 2 hours before the test. Your provider will also tell you which medicines to take or not take on the day of your test.  What will happen during a nonstress test?  You may be asked to lie down or recline back for the test on a bed. One or 2 belts with sensors will be placed around your abdomen. Your baby's heart rate will be recorded with a machine. If your baby does not move, your baby may be asleep. Your healthcare provider may make a noise near your abdomen to try to wake your baby. The test usually takes about 20 minutes, but can take longer if your baby needs to be awakened.        What do I need to know about the test results?  Your baby will be expected to move at least 2 times for a certain amount of time. Your baby's heart rate will be expected to go up by a certain number of beats per minute during movement. If your baby does not move as expected, the test may need to be repeated or you may need other tests.  CARE AGREEMENT:   You have the right to help plan your care. Learn about your health condition and how it may be treated. Discuss treatment options with your healthcare providers to decide what care you want to receive. You always have the right to refuse treatment. The above information is an  only. It is not intended as  medical advice for individual conditions or treatments. Talk to your doctor, nurse or pharmacist before following any medical regimen to see if it is safe and effective for you.  © 2023 Information is for End User's use only and may not be sold, redistributed or otherwise used for commercial purposes. Thank you for choosing us for your  care today.  If you have any questions about your ultrasound or care, please do not hesitate to contact us or your primary obstetrician.        Some general instructions for your pregnancy are:    Exercise: Aim for 150 minutes per week of regular exercise.  Walking is great!  Nutrition: Choose healthy sources of calcium, iron, and protein.  Avoid ultraprocessed foods and added sugar.  Learn about Preeclampsia: preeclampsia is a common, potentially serious high blood pressure complication in pregnancy.  A blood pressure of 140mmHg (systolic or top number) or 90mmHg (diastolic or bottom number) should be evaluated by your doctor.  Aspirin is sometimes prescribed in early pregnancy to prevent preeclampsia in women with risk factors - ask your obstetrician if you should be on this medication.  For more resources, visit:  https://www.[email or web address]  If you smoke, please try to quit completely but also try to reduce your smoking by as much as possible (as soon as possible).  Do not vape.  Please also avoid cannabis products.  Other warning signs to watch out for in pregnancy or postpartum: chest pain, obstructed breathing or shortness of breath, seizures, thoughts of hurting yourself or your baby, bleeding, a painful or swollen leg, fever, or headache (see [facility] POST-BIRTH Warning Signs campaign).  If these happen call 911.  Itching is also not normal in pregnancy and if you experience this, especially over your hands and feet, potentially worse at night, notify your doctors.

## 2024-12-24 ENCOUNTER — ULTRASOUND (OUTPATIENT)
Dept: PERINATAL CARE | Facility: CLINIC | Age: 29
End: 2024-12-24
Payer: COMMERCIAL

## 2024-12-24 VITALS
HEART RATE: 72 BPM | DIASTOLIC BLOOD PRESSURE: 76 MMHG | SYSTOLIC BLOOD PRESSURE: 114 MMHG | HEIGHT: 62 IN | BODY MASS INDEX: 30.55 KG/M2 | WEIGHT: 166 LBS

## 2024-12-24 DIAGNOSIS — O24.419 GDM, CLASS A2: Primary | ICD-10-CM

## 2024-12-24 DIAGNOSIS — Z3A.33 33 WEEKS GESTATION OF PREGNANCY: ICD-10-CM

## 2024-12-24 PROCEDURE — 76815 OB US LIMITED FETUS(S): CPT | Performed by: OBSTETRICS & GYNECOLOGY

## 2024-12-24 PROCEDURE — 59025 FETAL NON-STRESS TEST: CPT | Performed by: OBSTETRICS & GYNECOLOGY

## 2024-12-24 NOTE — PROGRESS NOTES
Non-Stress Testing:    Non-Stress test, equipment, procedure, and expected outcomes explained. Reviewed fetal kick counts and when to call OB.Verified patient understanding of fetal kick counts with teach back method. Patient reports feeling daily fetal movements. Patient has no questions or concerns.     Reviewed non-stress test with Dr. Velasquez.

## 2024-12-24 NOTE — PROGRESS NOTES
This patient received  care under my supervision on 24 at 33w6d gestational age at [facility].  NST is reactive.  -Jane Velasquez MD

## 2024-12-27 ENCOUNTER — ROUTINE PRENATAL (OUTPATIENT)
Dept: PERINATAL CARE | Facility: CLINIC | Age: 29
End: 2024-12-27
Payer: COMMERCIAL

## 2024-12-27 VITALS
WEIGHT: 169.4 LBS | HEIGHT: 62 IN | HEART RATE: 89 BPM | SYSTOLIC BLOOD PRESSURE: 118 MMHG | BODY MASS INDEX: 31.17 KG/M2 | DIASTOLIC BLOOD PRESSURE: 64 MMHG

## 2024-12-27 DIAGNOSIS — Z3A.34 34 WEEKS GESTATION OF PREGNANCY: Primary | ICD-10-CM

## 2024-12-27 DIAGNOSIS — O24.419 GDM, CLASS A2: ICD-10-CM

## 2024-12-27 PROCEDURE — 59025 FETAL NON-STRESS TEST: CPT | Performed by: OBSTETRICS & GYNECOLOGY

## 2024-12-27 NOTE — PATIENT INSTRUCTIONS
Kick Counts in Pregnancy   AMBULATORY CARE:   Kick counts  measure how much your baby is moving in your womb. A kick from your baby can be felt as a twist, turn, swish, roll, or jab. It is common to feel your baby kicking at 26 to 28 weeks of pregnancy. You may feel your baby kick as early as 20 weeks of pregnancy. You may want to start counting at 28 weeks.   Contact your doctor immediately if:   You feel a change in the number of kicks or movements of your baby.      You feel fewer than 10 kicks within 2 hours.      You have questions or concerns about your baby's movements.     Why measure kick counts:  Your baby's movement may provide information about your baby's health. He or she may move less, or not at all, if there are problems. Your baby may move less if he or she is not getting enough oxygen or nutrition from the placenta. Do not smoke while you are pregnant. Smoking decreases the amount of oxygen that gets to your baby. Talk to your healthcare provider if you need help to quit smoking. Tell your healthcare provider as soon as you feel a change in your baby's movements.  When to measure kick counts:   Measure kick counts at the same time every day.       Measure kick counts when your baby is awake and most active. Your baby may be most active in the evening.     How to measure kick counts:  Check that your baby is awake before you measure kick counts. You can wake up your baby by lightly pushing on your belly, walking, or drinking something cold. Your healthcare provider may tell you different ways to measure kick counts. You may be told to do the following:  Use a chart or clock to keep track of the time you start and finish counting.      Sit in a chair or lie on your left side.      Place your hands on the largest part of your belly.      Count until you reach 10 kicks. Write down how much time it takes to count 10 kicks.      It may take 30 minutes to 2 hours to count 10 kicks. It should not take more  than 2 hours to count 10 kicks.     Follow up with your doctor as directed:  Write down your questions so you remember to ask them during your visits.   © Copyright [employer] 2023 Information is for End User's use only and may not be sold, redistributed or otherwise used for commercial purposes.  The above information is an  only. It is not intended as medical advice for individual conditions or treatments. Talk to your doctor, nurse or pharmacist before following any medical regimen to see if it is safe and effective for you. Nonstress Test for Pregnancy   WHAT YOU NEED TO KNOW:   What do I need to know about a nonstress test?  A nonstress test measures your baby's heart rate and movements. Nonstress means that no stress will be placed on your baby during the test.  How do I prepare for a nonstress test?  Your healthcare provider will talk to you about how to prepare for this test. Your provider may tell you to eat and drink plenty of liquids before your test. If you smoke, you may be asked not to smoke within 2 hours before the test. Your provider will also tell you which medicines to take or not take on the day of your test.  What will happen during a nonstress test?  You may be asked to lie down or recline back for the test on a bed. One or 2 belts with sensors will be placed around your abdomen. Your baby's heart rate will be recorded with a machine. If your baby does not move, your baby may be asleep. Your healthcare provider may make a noise near your abdomen to try to wake your baby. The test usually takes about 20 minutes, but can take longer if your baby needs to be awakened.        What do I need to know about the test results?  Your baby will be expected to move at least 2 times for a certain amount of time. Your baby's heart rate will be expected to go up by a certain number of beats per minute during movement. If your baby does not move as expected, the test may need to be repeated or you  may need other tests.  CARE AGREEMENT:   You have the right to help plan your care. Learn about your health condition and how it may be treated. Discuss treatment options with your healthcare providers to decide what care you want to receive. You always have the right to refuse treatment. The above information is an  only. It is not intended as medical advice for individual conditions or treatments. Talk to your doctor, nurse or pharmacist before following any medical regimen to see if it is safe and effective for you.  © 2023 Information is for End User's use only and may not be sold, redistributed or otherwise used for commercial purposes. Thank you for choosing us for your  care today.  If you have any questions about your ultrasound or care, please do not hesitate to contact us or your primary obstetrician.        Some general instructions for your pregnancy are:    Exercise: Aim for 150 minutes per week of regular exercise.  Walking is great!  Nutrition: Choose healthy sources of calcium, iron, and protein.  Avoid ultraprocessed foods and added sugar.  Learn about Preeclampsia: preeclampsia is a common, potentially serious high blood pressure complication in pregnancy.  A blood pressure of 140mmHg (systolic or top number) or 90mmHg (diastolic or bottom number) should be evaluated by your doctor.  Aspirin is sometimes prescribed in early pregnancy to prevent preeclampsia in women with risk factors - ask your obstetrician if you should be on this medication.  For more resources, visit:  [email or web address]  If you smoke, please try to quit completely but also try to reduce your smoking by as much as possible (as soon as possible).  Do not vape.  Please also avoid cannabis products.  Other warning signs to watch out for in pregnancy or postpartum: chest pain, obstructed breathing or shortness of breath, seizures, thoughts of hurting yourself  or your baby, bleeding, a painful or swollen leg, fever, or headache (see [facility]).  If these happen call 911.  Itching is also not normal in pregnancy and if you experience this, especially over your hands and feet, potentially worse at night, notify your doctors.     Thank you for choosing us for your  care today.  If you have any questions about your ultrasound or care, please do not hesitate to contact us or your primary obstetrician.        Some general instructions for your pregnancy are:    Exercise: Aim for 150 minutes per week of regular exercise.  Walking is great!  Nutrition: Choose healthy sources of calcium, iron, and protein.  Avoid ultraprocessed foods and added sugar.  Learn about Preeclampsia: preeclampsia is a common, potentially serious high blood pressure complication in pregnancy.  A blood pressure of 140mmHg (systolic or top number) or 90mmHg (diastolic or bottom number) should be evaluated by your doctor.  Aspirin is sometimes prescribed in early pregnancy to prevent preeclampsia in women with risk factors - ask your obstetrician if you should be on this medication.  For more resources, visit:  https://[email or web address]  If you smoke, please try to quit completely but also try to reduce your smoking by as much as possible (as soon as possible).  Do not vape.  Please also avoid cannabis products.  Other warning signs to watch out for in pregnancy or postpartum: chest pain, obstructed breathing or shortness of breath, seizures, thoughts of hurting yourself or your baby, bleeding, a painful or swollen leg, fever, or headache (see [facility] Sign[facility]).  If these happen call 911.  Itching is also not normal in pregnancy and if you experience this, especially over your hands and feet, potentially worse at night, notify your doctors.

## 2024-12-27 NOTE — PROGRESS NOTES
Repeat Non-Stress Testing:    Patient verbalizes +FM. Pt denies ALL:               Leaking of fluid   Contractions   Vaginal bleeding   Decreased fetal movement    Patient is performing daily kick counts. Patient has no questions or concerns.   NST strip reviewed by Dr. Morocho.

## 2024-12-30 ENCOUNTER — HOSPITAL ENCOUNTER (OUTPATIENT)
Facility: HOSPITAL | Age: 29
Discharge: HOME | End: 2024-12-30
Attending: STUDENT IN AN ORGANIZED HEALTH CARE EDUCATION/TRAINING PROGRAM | Admitting: STUDENT IN AN ORGANIZED HEALTH CARE EDUCATION/TRAINING PROGRAM
Payer: COMMERCIAL

## 2024-12-30 ENCOUNTER — NURSE TRIAGE (OUTPATIENT)
Age: 29
End: 2024-12-30

## 2024-12-30 VITALS
DIASTOLIC BLOOD PRESSURE: 74 MMHG | SYSTOLIC BLOOD PRESSURE: 109 MMHG | TEMPERATURE: 98.9 F | RESPIRATION RATE: 16 BRPM | HEART RATE: 86 BPM

## 2024-12-30 PROBLEM — R10.9 ABDOMINAL PAIN AFFECTING PREGNANCY: Status: ACTIVE | Noted: 2024-12-30

## 2024-12-30 PROBLEM — O26.899 ABDOMINAL PAIN AFFECTING PREGNANCY: Status: ACTIVE | Noted: 2024-12-30

## 2024-12-30 LAB
BACTERIA UR QL AUTO: ABNORMAL /HPF
BILIRUB UR QL STRIP: NEGATIVE
CAOX CRY URNS QL MICRO: ABNORMAL /HPF
CLARITY UR: CLEAR
COLOR UR: YELLOW
GLUCOSE UR STRIP-MCNC: NEGATIVE MG/DL
HGB UR QL STRIP.AUTO: NEGATIVE
KETONES UR STRIP-MCNC: NEGATIVE MG/DL
LEUKOCYTE ESTERASE UR QL STRIP: ABNORMAL
MUCOUS THREADS UR QL AUTO: ABNORMAL
NITRITE UR QL STRIP: NEGATIVE
NON-SQ EPI CELLS URNS QL MICRO: ABNORMAL /HPF
PH UR STRIP.AUTO: 6.5 [PH] (ref 4.5–8)
PROT UR STRIP-MCNC: ABNORMAL MG/DL
RBC #/AREA URNS AUTO: ABNORMAL /HPF
SP GR UR STRIP.AUTO: 1.02 (ref 1–1.03)
UROBILINOGEN UR QL STRIP.AUTO: 0.2 E.U./DL
WBC #/AREA URNS AUTO: ABNORMAL /HPF

## 2024-12-30 PROCEDURE — 99213 OFFICE O/P EST LOW 20 MIN: CPT

## 2024-12-30 PROCEDURE — 81001 URINALYSIS AUTO W/SCOPE: CPT

## 2024-12-30 PROCEDURE — NC001 PR NO CHARGE: Performed by: STUDENT IN AN ORGANIZED HEALTH CARE EDUCATION/TRAINING PROGRAM

## 2024-12-30 PROCEDURE — G0463 HOSPITAL OUTPT CLINIC VISIT: HCPCS

## 2024-12-30 RX ORDER — FAMOTIDINE 20 MG/1
20 TABLET, FILM COATED ORAL ONCE
Status: DISCONTINUED | OUTPATIENT
Start: 2024-12-30 | End: 2024-12-30 | Stop reason: HOSPADM

## 2024-12-30 RX ORDER — FAMOTIDINE 40 MG/5ML
20 POWDER, FOR SUSPENSION ORAL DAILY
Status: CANCELLED | OUTPATIENT
Start: 2024-12-30 | End: 2024-12-31

## 2024-12-30 NOTE — TELEPHONE ENCOUNTER
Mikala called in after returning home from [facility] at L/D where cervical exam was completed. She now notes red spotting with wiping x one.  Denies increased pain, LOF or additional concerns.     Reassured not uncommon after cervical check to have small amount of red spotting should resolve over next 24hours. May end as brown spotting over next couple days.  Continue to monitor, call if bleeding increases, develops increased pain, decreased fetal movement or LOF.     Patient verbalized understanding and agreement to plan.  FYI to DR. Mcgrath

## 2024-12-30 NOTE — PROGRESS NOTES
Progress Note - OB/GYN   Name: Mikala Cunha 29 y.o. female I MRN: [id number]  Unit/Bed#:  TRIAGE  I Date of Admission: 2024   Date of Service: 2024 I Hospital Day: 0     Assessment & Plan      L&D Triage Note - OB/GYN  Mikala Cunha 29 y.o. female MRN: [id number]  Unit/Bed#:  TRIAGE  Encounter: [id number]      ASSESSMENT:    Mikala Cunha is a 29 y.o.  at 34w5d presenting with left upper quadrant abdominal pain did not take any medicine for the pain at home, associated with heartburn, denies headache, visual disturbances, LOF, DFM or VB.     PLAN:    1) Fetal monitoring / Abdominal pain  - FHT reactive, contractions q7 not felt by pt.  - SVE 0.5/30/-4  - Urine Dip  - traces of leukocytes.   - Pepcid + PO hydration.   - Recheck in 2 hours.     2) 34 weeks gestation of pregnancy  - Continue routine prenatal care  - Discharge from OB triage with  labor precautions    - Reviewed rupture of membranes, false vs true labor, decreased fetal movement, and vaginal bleeding   - Pt to call provider with any concerns and follow up at her next scheduled prenatal appointment    - Case discussed with Dr. Mcgrath     SUBJECTIVE:    Mikala Cunha 29 y.o.  at 34w5d with an Estimated Date of Delivery: 25 who presents to triage for abdominal pain.  She otherwise denies contractions, leakage of fluid, vaginal bleeding, and decreased fetal movement.    Her current obstetrical history is significant for Uterine fibroid in pregnancy, excessive fetal growth. A2GDM    Her past obstetrical history is not significant.    OBJECTIVE:    Vitals:    24 1421   BP: 109/74   Pulse: 86   Resp: 16   Temp: 98.9 °F (37.2 °C)       ROS:  Constitutional: Negative  Respiratory: Negative  Cardiovascular: Negative    Gastrointestinal: Negative    General Physical Exam:  General: Well appearing, no distress  Respiratory: Unlabored breathing  Cardiovascular: Regular rate.  Abdomen: Soft, gravid,  tender on palpation o LUQ abdominal pain.   Fundal Height: Appropriate for gestational age.  Extremities: Warm and well perfused.  Non tender.      Cervical Exam    SVE: Cervical Dilation: Fingertip  Cervical Effacement: 30  Cervical Consistency: Firm  Fetal Station: Ballotable  Method: Manual  OB Examiner: Bobo    FETAL ASSESSMENT:  Fetal heart rate: Baseline Rate (FHR): 130 bpm  Variability: Moderate  Accelerations: 15 x 15 or greater, With fetal movement  Decelerations: None  FHR Category: Category I  [place]: Contraction Frequency (minutes): 7-8  Contraction Duration (seconds): 100-120    Sidra Broussard MD  OBGYN, PGY-1  12/30/24  4:26 PM

## 2024-12-30 NOTE — TELEPHONE ENCOUNTER
"OB patient 34w5d  with abdominal pain that comes and goes and constant pelvic pain. Rates abdominal pain 10/10 when occurs (intermittently, hasn't been timing) and constant pelvic pain 8/10 - states it is hard to walk. Denies LOF, vaginal bleeding. Endorses fetal movement. Advised evaluation in L&D due to severe pain. Patient will proceed to triage now.     ESC sent to Dr. Mcgrath and AN L&D Charge RN as notification.     Reason for Disposition   MODERATE-SEVERE abdominal pain (e.g., interferes with normal activities, awakens from sleep)    Answer Assessment - Initial Assessment Questions  1. LOCATION: \"Where does it hurt?\"       Entire abdomen and lower pelvic area  2. RADIATION: \"Does the pain shoot anywhere else?\" (e.g., chest, back)      Lower pelvic area  3. ONSET: \"When did the pain begin?\" (Minutes, hours or days ago)       4 days ago  4. SUDDEN: \"Gradual or sudden onset?\"      gradual  5. PATTERN: \"Does the pain come and go, or has it been constant since it started?\"       constant  6. SEVERITY: \"How bad is the pain?\" \"What does it keep you from doing?\"  (e.g., Scale 1-10; mild, moderate, or severe)      8/10 pelvic pain, 10/10 abdominal pain  7. RECURRENT SYMPTOM: \"Have you ever had this type of stomach pain before?\" If Yes, ask: \"When was the last time?\" and \"What happened that time?\"       denies  8. CAUSE: \"What do you think is causing the stomach pain?      unknown  9. RELIEVING/AGGRAVATING FACTORS: \"What makes it better or worse?\" (e.g., antacids, bowel movement, movement)      denies  10. FETAL MOVEMENT: \"Has the baby's movement decreased or changed significantly from normal?\"        Endorses   11. OTHER SYMPTOMS: \"Do you have any other symptoms?\" (e.g., back pain, contractions, diarrhea, fever, headache, urination pain, vaginal bleeding, vaginal discharge, vomiting)        denies  12. FAWN: \"What date are you expecting to deliver?\"        25    Protocols used: Pregnancy - Abdominal Pain " Greater Than 20 Weeks EGA-Adult-[place]

## 2024-12-31 ENCOUNTER — ULTRASOUND (OUTPATIENT)
Dept: PERINATAL CARE | Facility: CLINIC | Age: 29
End: 2024-12-31
Payer: COMMERCIAL

## 2024-12-31 VITALS
DIASTOLIC BLOOD PRESSURE: 70 MMHG | HEIGHT: 62 IN | SYSTOLIC BLOOD PRESSURE: 112 MMHG | WEIGHT: 170.8 LBS | BODY MASS INDEX: 31.43 KG/M2 | HEART RATE: 74 BPM

## 2024-12-31 DIAGNOSIS — O24.419 GDM, CLASS A2: ICD-10-CM

## 2024-12-31 DIAGNOSIS — Z3A.34 34 WEEKS GESTATION OF PREGNANCY: Primary | ICD-10-CM

## 2024-12-31 PROBLEM — Z34.93 PRENATAL CARE IN THIRD TRIMESTER: Status: ACTIVE | Noted: 2024-12-31

## 2024-12-31 PROCEDURE — 59025 FETAL NON-STRESS TEST: CPT

## 2024-12-31 PROCEDURE — 76815 OB US LIMITED FETUS(S): CPT

## 2024-12-31 PROCEDURE — 99213 OFFICE O/P EST LOW 20 MIN: CPT

## 2024-12-31 NOTE — ASSESSMENT & PLAN NOTE
Continue to follow with diabetes education  If 20% or more FBS are >95 or 2 hr pp are >120 she should be started on insulin or a oral hypoglycemic such as metformin or glyburide.   If medications are required to control her diabetes, she will require twice weekly fetal testing with NST's starting at 32 weeks. Delivery would be recommended around her due date.  If she does not require any medications to control her diabetes then she can start fetal testing at 40 weeks and be delivered by 41 weeks.   Postnatally after delivery, most patients with gestational diabetes can stop their insulin and gestational diabetes diet. Recommend she complete a 2 hour glucose tolerance test at 6 weeks postpartum  Patients with gestational diabetes have a higher risk for developing overt diabetes in the future.  Recommend she be screened for diabetes yearly.  SmartLink not supported outside of the Encounter Diagnoses SmartSection.

## 2024-12-31 NOTE — ASSESSMENT & PLAN NOTE
US 12/11/24  MEASUREMENTS (* Included In Average GA)     AC             31.87 cm        35 weeks 6 days* (>99%)  BPD             8.09 cm        32 weeks 4 days* (56%)  HC             29.95 cm        33 weeks 1 day * (44%)  Femur           6.12 cm        31 weeks 6 days* (31%)     EFW [place] 4   2354 grams - 5 lbs 3 oz                 (94%)  Next growth scans scheduled for 1/10/25    -Possible macrosomia.  -Maternal weight gain up to date 34 lbs at 32 weeks GA.   -Will start SMBG and follow GDM meal plan.

## 2024-12-31 NOTE — ASSESSMENT & PLAN NOTE
-Continue PNV  - Labor precautions reviewed  - Fetal kick counts reviewed  - Labs: UTD  - Genetics: neg CF and SMN carrier, normal NIPS   - Ultrasounds: Most recent US 24 showed possible macrosomia with EFW 95% and AC >99%; Next US scheduled for 1/10/25  - Tdap: Done 11/15/24   - Flu Shot: Declined  - RSV: received at 32 weeks  - COVID: Unvaccinated  - Rhogam: N/A  - Delivery:  with epidural  - Contraception: None  - Breastfeeding: Yes; breast; has a pump at home  - Pediatrician: Planning to establish with her peds office  -GBS: 36 weeks  - RTO in 2 weeks

## 2024-12-31 NOTE — PATIENT INSTRUCTIONS
Thank you for choosing us for your  care today.  If you have any questions about your ultrasound or care, please do not hesitate to contact us or your primary obstetrician.        Some general instructions for your pregnancy are:    Exercise: Aim for 150 minutes per week of regular exercise.  Walking is great!  Nutrition: Choose healthy sources of calcium, iron, and protein.  Avoid ultraprocessed foods and added sugar.  Learn about Preeclampsia: preeclampsia is a common, potentially serious high blood pressure complication in pregnancy.  A blood pressure of 140mmHg (systolic or top number) or 90mmHg (diastolic or bottom number) should be evaluated by your doctor.  Aspirin is sometimes prescribed in early pregnancy to prevent preeclampsia in women with risk factors - ask your obstetrician if you should be on this medication.  For more resources, visit:  https://www.highriskpregnancyinfo.org/preeclampsia  If you smoke, please try to quit completely but also try to reduce your smoking by as much as possible (as soon as possible).  Do not vape.  Please also avoid cannabis products.  Other warning signs to watch out for in pregnancy or postpartum: chest pain, obstructed breathing or shortness of breath, seizures, thoughts of hurting yourself or your baby, bleeding, a painful or swollen leg, fever, or headache (see [facility] Sign[facility]).  If these happen call 911.  Itching is also not normal in pregnancy and if you experience this, especially over your hands and feet, potentially worse at night, notify your doctors.     Kick Counts in Pregnancy   AMBULATORY CARE:   Kick counts  measure how much your baby is moving in your womb. A kick from your baby can be felt as a twist, turn, swish, roll, or jab. It is common to feel your baby kicking at 26 to 28 weeks of pregnancy. You may feel your baby kick as early as 20 weeks of pregnancy. You may want to start counting at 28 weeks.   Contact your  doctor immediately if:   You feel a change in the number of kicks or movements of your baby.      You feel fewer than 10 kicks within 2 hours.      You have questions or concerns about your baby's movements.     Why measure kick counts:  Your baby's movement may provide information about your baby's health. He or she may move less, or not at all, if there are problems. Your baby may move less if he or she is not getting enough oxygen or nutrition from the placenta. Do not smoke while you are pregnant. Smoking decreases the amount of oxygen that gets to your baby. Talk to your healthcare provider if you need help to quit smoking. Tell your healthcare provider as soon as you feel a change in your baby's movements.  When to measure kick counts:   Measure kick counts at the same time every day.       Measure kick counts when your baby is awake and most active. Your baby may be most active in the evening.     How to measure kick counts:  Check that your baby is awake before you measure kick counts. You can wake up your baby by lightly pushing on your belly, walking, or drinking something cold. Your healthcare provider may tell you different ways to measure kick counts. You may be told to do the following:  Use a chart or clock to keep track of the time you start and finish counting.      Sit in a chair or lie on your left side.      Place your hands on the largest part of your belly.      Count until you reach 10 kicks. Write down how much time it takes to count 10 kicks.      It may take 30 minutes to 2 hours to count 10 kicks. It should not take more than 2 hours to count 10 kicks.     Follow up with your doctor as directed:  Write down your questions so you remember to ask them during your visits.   © Copyright [employer] 2023 Information is for End User's use only and may not be sold, redistributed or otherwise used for commercial purposes.  The above information is an  only. It is not intended as  medical advice for individual conditions or treatments. Talk to your doctor, nurse or pharmacist before following any medical regimen to see if it is safe and effective for you. Nonstress Test for Pregnancy   WHAT YOU NEED TO KNOW:   What do I need to know about a nonstress test?  A nonstress test measures your baby's heart rate and movements. Nonstress means that no stress will be placed on your baby during the test.  How do I prepare for a nonstress test?  Your healthcare provider will talk to you about how to prepare for this test. Your provider may tell you to eat and drink plenty of liquids before your test. If you smoke, you may be asked not to smoke within 2 hours before the test. Your provider will also tell you which medicines to take or not take on the day of your test.  What will happen during a nonstress test?  You may be asked to lie down or recline back for the test on a bed. One or 2 belts with sensors will be placed around your abdomen. Your baby's heart rate will be recorded with a machine. If your baby does not move, your baby may be asleep. Your healthcare provider may make a noise near your abdomen to try to wake your baby. The test usually takes about 20 minutes, but can take longer if your baby needs to be awakened.        What do I need to know about the test results?  Your baby will be expected to move at least 2 times for a certain amount of time. Your baby's heart rate will be expected to go up by a certain number of beats per minute during movement. If your baby does not move as expected, the test may need to be repeated or you may need other tests.  CARE AGREEMENT:   You have the right to help plan your care. Learn about your health condition and how it may be treated. Discuss treatment options with your healthcare providers to decide what care you want to receive. You always have the right to refuse treatment. The above information is an  only. It is not intended as  medical advice for individual conditions or treatments. Talk to your doctor, nurse or pharmacist before following any medical regimen to see if it is safe and effective for you.  © 2023 Information is for End User's use only and may not be sold, redistributed or otherwise used for commercial purposes. Thank you for choosing us for your  care today.  If you have any questions about your ultrasound or care, please do not hesitate to contact us or your primary obstetrician.        Some general instructions for your pregnancy are:    Exercise: Aim for 150 minutes per week of regular exercise.  Walking is great!  Nutrition: Choose healthy sources of calcium, iron, and protein.  Avoid ultraprocessed foods and added sugar.  Learn about Preeclampsia: preeclampsia is a common, potentially serious high blood pressure complication in pregnancy.  A blood pressure of 140mmHg (systolic or top number) or 90mmHg (diastolic or bottom number) should be evaluated by your doctor.  Aspirin is sometimes prescribed in early pregnancy to prevent preeclampsia in women with risk factors - ask your obstetrician if you should be on this medication.  For more resources, visit:  https://www.highriskpregnancyinfo.org/preeclampsia  If you smoke, please try to quit completely but also try to reduce your smoking by as much as possible (as soon as possible).  Do not vape.  Please also avoid cannabis products.  Other warning signs to watch out for in pregnancy or postpartum: chest pain, obstructed breathing or shortness of breath, seizures, thoughts of hurting yourself or your baby, bleeding, a painful or swollen leg, fever, or headache (see [facility] POST-BIRTH Warning Signs campaign).  If these happen call 911.  Itching is also not normal in pregnancy and if you experience this, especially over your hands and feet, potentially worse at night, notify your doctors.

## 2024-12-31 NOTE — PROGRESS NOTES
"[facility]: Ms. Cunha was seen today at 34w6d gestational age for NST (found under the pregnancy episode) which I reviewed the RN assessment and agree, and KRANTHI (see ultrasound report under OB procedures tab).  See ultrasound report under \"OB Procedures\" tab.    Polly SMITH    I spent 10 minutes devoted to patient care (3 min chart preparation, 4 minutes face to face and 3 minutes documenting).    "

## 2024-12-31 NOTE — ASSESSMENT & PLAN NOTE
-Pre-pregnancy weight 130 lbs.  -Current weight 164 lbs.  -TWG 34 lbs.  -Recommended weight gain 25 to 35 lbs.  -Start GDM meal plan and follow up with dietitian.

## 2024-12-31 NOTE — PROGRESS NOTES
OB/GYN  PN Visit  Mikala Cunha  [id number]  2025  2:49 PM  LUIS Walsh    S: 29 y.o.  35w1d here for PN visit.    OB complaints:  Denies c/o n/v/ha, no edema, no smoking, no DV.   No vb/lof  No cramping/ctxns or signs of PTL.    She reports fetal movement.     O:    Pre- Vitals      Flowsheet Row Most Recent Value   Prenatal Assessment    Fetal Heart Rate 140   Fundal Height (cm) 35 cm   Prenatal Vitals    Blood Pressure 112/80   Weight - Scale 77.1 kg (170 lb)   Urine Albumin/Glucose    Dilation/Effacement/Station    Vaginal Drainage    Edema               Gen: no acute distress, nonlabored breathing.  OB exam completed: fundal height, +FHT.  Urine: -/-    A/P:  Problem List Items Addressed This Visit       Uterine fibroid in pregnancy      1. Subserosal myometrium fibroid located in the left lateral corpus  region, measuring 4.0 x 3.4 x 3.9cm with a volume of 27.4cc. Color doppler flow was not increased. The appearance was heterogeneous.     2. Subserosal myometrium fibroid located in the middle anterior corpus region, measuring 2.3 x 2.0 x 2.2cm with a volume of 5.5cc. Color doppler flow was not increased. The appearance was heterogeneous.             Excessive fetal growth affecting management of mother in third trimester, antepartum - Primary    US 24  MEASUREMENTS (* Included In Average GA)     AC             31.87 cm        35 weeks 6 days* (>99%)  BPD             8.09 cm        32 weeks 4 days* (56%)  HC             29.95 cm        33 weeks 1 day * (44%)  Femur           6.12 cm        31 weeks 6 days* (31%)     EFW [place] 4   2354 grams - 5 lbs 3 oz                 (94%)  Next growth scans scheduled for 1/10/25    -Possible macrosomia.  -Maternal weight gain up to date 34 lbs at 32 weeks GA.   -Will start SMBG and follow GDM meal plan.           BMI 30.0-30.9,adult    -Pre-pregnancy weight 130 lbs.  -Current weight 164 lbs.  -TWG 34 lbs.  -Recommended weight gain 25 to 35  lbs.  -Start GDM meal plan and follow up with dietitian.          GDM, class A2    Continue to follow with diabetes education  If 20% or more FBS are >95 or 2 hr pp are >120 she should be started on insulin or a oral hypoglycemic such as metformin or glyburide.   If medications are required to control her diabetes, she will require twice weekly fetal testing with NST's starting at 32 weeks. Delivery would be recommended around her due date.  If she does not require any medications to control her diabetes then she can start fetal testing at 40 weeks and be delivered by 41 weeks.   Postnatally after delivery, most patients with gestational diabetes can stop their insulin and gestational diabetes diet. Recommend she complete a 2 hour glucose tolerance test at 6 weeks postpartum  Patients with gestational diabetes have a higher risk for developing overt diabetes in the future.  Recommend she be screened for diabetes yearly.  SmartLink not supported outside of the Encounter Diagnoses SmartSection.           35 weeks gestation of pregnancy    -Continue PNV  - Labor precautions reviewed  - Fetal kick counts reviewed  - Labs: UTD  - Genetics: neg CF and SMN carrier, normal NIPS   - Ultrasounds: Most recent US 24 showed possible macrosomia with EFW 95% and AC >99%; Next US scheduled for 1/10/25  - Tdap: Done 11/15/24   - Flu Shot: Declined  - RSV: received at 32 weeks  - COVID: Unvaccinated  - Rhogam: N/A  - Delivery:  with epidural  - Contraception: None  - Breastfeeding: Yes; breast; has a pump at home  - Pediatrician: Planning to establish with her peds office  -GBS: 36 weeks  - RTO in 2 weeks         Prenatal care in third trimester       LUIS Walsh  2025  2:49 PM

## 2024-12-31 NOTE — PROGRESS NOTES
Repeat Non-Stress Testing:    Patient verbalizes +FM. Pt denies ALL:               Leaking of fluid   Contractions   Vaginal bleeding   Decreased fetal movement    Patient is performing daily kick counts. Patient has no questions or concerns.   NST strip reviewed by LUIS Orozco.       PTL precautions given to patient . Patient verbalized understanding

## 2025-01-02 ENCOUNTER — ROUTINE PRENATAL (OUTPATIENT)
Dept: OBGYN CLINIC | Facility: CLINIC | Age: 30
End: 2025-01-02

## 2025-01-02 VITALS
WEIGHT: 170 LBS | DIASTOLIC BLOOD PRESSURE: 80 MMHG | BODY MASS INDEX: 31.28 KG/M2 | SYSTOLIC BLOOD PRESSURE: 112 MMHG | HEIGHT: 62 IN

## 2025-01-02 DIAGNOSIS — O36.63X0 EXCESSIVE FETAL GROWTH AFFECTING MANAGEMENT OF PREGNANCY IN THIRD TRIMESTER, SINGLE OR UNSPECIFIED FETUS: Primary | ICD-10-CM

## 2025-01-02 DIAGNOSIS — Z34.93 PRENATAL CARE IN THIRD TRIMESTER: ICD-10-CM

## 2025-01-02 DIAGNOSIS — Z3A.35 35 WEEKS GESTATION OF PREGNANCY: ICD-10-CM

## 2025-01-02 DIAGNOSIS — O24.419 GDM, CLASS A2: ICD-10-CM

## 2025-01-02 DIAGNOSIS — O34.10 UTERINE FIBROID IN PREGNANCY: ICD-10-CM

## 2025-01-02 DIAGNOSIS — D25.9 UTERINE FIBROID IN PREGNANCY: ICD-10-CM

## 2025-01-02 PROCEDURE — PNV

## 2025-01-03 ENCOUNTER — TELEPHONE (OUTPATIENT)
Dept: PERINATAL CARE | Facility: CLINIC | Age: 30
End: 2025-01-03

## 2025-01-03 NOTE — TELEPHONE ENCOUNTER
Reason for Call: Hypoglycemia  (Per Dexcom Report; not confirmed by finger-stick), Gestational Diabetes (35w2d  Insulin-controlled), and CGM in place (Dexcom G7)    Hypoglycemia Noted:      Trend (last 7 days):      Outcome: Spoke with patient. She confirmed she is currently taking 14 units of Lantus. She has not missed any doses. Did not confirm hypoglycemia on Dexcom report with a fingerstick. After discussion, concluded low blood sugars likely due to going too long without eating.     Recommendations:  - Do not exceed 3.5hrs without eating during wake hours  - Avoid exceeding 10hrs overnight fasting  - Decrease to 12 units of Lantus if another low blood sugar occurs despite following diet recommendations and notify diabetes team on Monday (1/6/25)  - Use the 15-15 rule to treat a low blood sugar <60  - Always carry a fast-acting carbohydrate and meter at all times  - Keep alarms/alerts on for Dexcom    Rosalia Schwab RD  Diabetes Educator   [facility]  Diabetes and Pregnancy Program

## 2025-01-06 ENCOUNTER — ULTRASOUND (OUTPATIENT)
Dept: OBGYN CLINIC | Facility: CLINIC | Age: 30
End: 2025-01-06

## 2025-01-06 VITALS
DIASTOLIC BLOOD PRESSURE: 84 MMHG | SYSTOLIC BLOOD PRESSURE: 122 MMHG | HEART RATE: 82 BPM | WEIGHT: 168 LBS | HEIGHT: 62 IN | BODY MASS INDEX: 30.91 KG/M2 | OXYGEN SATURATION: 99 %

## 2025-01-06 DIAGNOSIS — Z34.93 PRENATAL CARE IN THIRD TRIMESTER: Primary | ICD-10-CM

## 2025-01-06 PROCEDURE — 87150 DNA/RNA AMPLIFIED PROBE: CPT | Performed by: STUDENT IN AN ORGANIZED HEALTH CARE EDUCATION/TRAINING PROGRAM

## 2025-01-06 PROCEDURE — PNV: Performed by: STUDENT IN AN ORGANIZED HEALTH CARE EDUCATION/TRAINING PROGRAM

## 2025-01-06 NOTE — PROGRESS NOTES
Assessment/Plan  Problem List Items Addressed This Visit       Prenatal care in third trimester - Primary      Mikala presents today for her 36 week visit. She is currently 35w5d  Vitals:    25 1400   BP:    Pulse: 82   SpO2: 99%         A GBS culture was collected at this visit.    Labor: I have reviewed the signs and symptoms of labor with the patient, including contractions q4-5 minutes for greater than 1 hour, vaginal bleeding, leaking fluid and decreased fetal movement.  I have emphasized the continued importance of paying close attention to the baby's movements.  I have instructed the patient to call the office with any of the above symptoms.    Anesthesia: I have reviewed the options for anesthesia in labor, including IV medications in early labor, regional anesthesia with an epidural or spinal, local anesthesia or general anesthesia if needed for a . Patient is interested in epidural for pain control               Subjective    Mikala is a 29 y.o. female,  with an Estimated Date of Delivery: 25 with a current gestational age of 35w5d. Patient reports  pelvic pressure . Fetal movement: active.     History  The following portions of the patient's history were reviewed and updated as appropriate: allergies, current medications, past family history, past medical history, past social history, past surgical history and problem list.    Objective  Vitals:    25 1400   BP:    Pulse: 82   SpO2: 99%     FHT: 140s  Urine: neg/neg   NST- reactive, baseline 140s, mod variability, pos accels, no decels. [place] quiet   KRANTHI 12

## 2025-01-06 NOTE — ASSESSMENT & PLAN NOTE
Mikala presents today for her 36 week visit. She is currently 35w5d  Vitals:    25 1400   BP:    Pulse: 82   SpO2: 99%         A GBS culture was collected at this visit.    Labor: I have reviewed the signs and symptoms of labor with the patient, including contractions q4-5 minutes for greater than 1 hour, vaginal bleeding, leaking fluid and decreased fetal movement.  I have emphasized the continued importance of paying close attention to the baby's movements.  I have instructed the patient to call the office with any of the above symptoms.    Anesthesia: I have reviewed the options for anesthesia in labor, including IV medications in early labor, regional anesthesia with an epidural or spinal, local anesthesia or general anesthesia if needed for a . Patient is interested in epidural for pain control

## 2025-01-07 DIAGNOSIS — O24.419 GDM, CLASS A2: Primary | ICD-10-CM

## 2025-01-07 RX ORDER — ACYCLOVIR 400 MG/1
1 TABLET ORAL
Qty: 3 EACH | Refills: 0 | Status: SHIPPED | OUTPATIENT
Start: 2025-01-07

## 2025-01-08 LAB — GP B STREP DNA SPEC QL NAA+PROBE: NEGATIVE

## 2025-01-09 ENCOUNTER — TELEPHONE (OUTPATIENT)
Age: 30
End: 2025-01-09

## 2025-01-09 NOTE — TELEPHONE ENCOUNTER
Spoke with patient who is 36w1d, .  She reports her blood sugar levels are not regulating and she is concerned.  She would like to discuss being induced.

## 2025-01-09 NOTE — TELEPHONE ENCOUNTER
Placed call to patient, pt expressed concern regarding her uncontrolled GDM. Has been following with the diabetes in pregnancy program, states adjusting regimen for management however has not been successful with regulating. Pt states she recalls previously reviewing with provider possible delivery scheduling around 37 weeks, if so she is interested in scheduling at this time. Reviewed chart and did not appreciate documented delivery timing recommendations at this time. Pt aware review with provider for delivery scheduling recommendations and will call pt back to review once received or if any other recommendations received at this time. Pt has US scheduled w/MFM tomorrow 1/10/25 and next routine prenatal visit in office as scheduled 1/14/25. Patient had no additional questions at this time.

## 2025-01-10 ENCOUNTER — HOSPITAL ENCOUNTER (OUTPATIENT)
Facility: HOSPITAL | Age: 30
Setting detail: OBSERVATION
Discharge: HOME | End: 2025-01-11
Attending: STUDENT IN AN ORGANIZED HEALTH CARE EDUCATION/TRAINING PROGRAM | Admitting: STUDENT IN AN ORGANIZED HEALTH CARE EDUCATION/TRAINING PROGRAM
Payer: COMMERCIAL

## 2025-01-10 ENCOUNTER — ULTRASOUND (OUTPATIENT)
Dept: PERINATAL CARE | Facility: CLINIC | Age: 30
End: 2025-01-10
Payer: COMMERCIAL

## 2025-01-10 VITALS
HEIGHT: 62 IN | DIASTOLIC BLOOD PRESSURE: 60 MMHG | BODY MASS INDEX: 31.28 KG/M2 | HEART RATE: 102 BPM | WEIGHT: 170 LBS | SYSTOLIC BLOOD PRESSURE: 120 MMHG

## 2025-01-10 DIAGNOSIS — Z3A.36 36 WEEKS GESTATION OF PREGNANCY: ICD-10-CM

## 2025-01-10 DIAGNOSIS — Z3A.36 36 WEEKS GESTATION OF PREGNANCY: Primary | ICD-10-CM

## 2025-01-10 DIAGNOSIS — O36.8390 ANTEPARTUM NON-REASSURING FETAL HEART RATE OR RHYTHM AFFECTING CARE OF MOTHER: ICD-10-CM

## 2025-01-10 DIAGNOSIS — O24.419 GDM, CLASS A2: Primary | ICD-10-CM

## 2025-01-10 DIAGNOSIS — Z36.89 ENCOUNTER FOR ULTRASOUND TO CHECK FETAL GROWTH: ICD-10-CM

## 2025-01-10 DIAGNOSIS — D25.9 UTERINE FIBROID IN PREGNANCY: ICD-10-CM

## 2025-01-10 DIAGNOSIS — O28.8 AFI (AMNIOTIC FLUID INDEX) BORDERLINE LOW: ICD-10-CM

## 2025-01-10 DIAGNOSIS — O34.10 UTERINE FIBROID IN PREGNANCY: ICD-10-CM

## 2025-01-10 LAB
ABO GROUP BLD: NORMAL
ABO GROUP BLD: NORMAL
BASOPHILS # BLD AUTO: 0.03 THOUSANDS/ΜL (ref 0–0.1)
BASOPHILS NFR BLD AUTO: 0 % (ref 0–1)
BLD GP AB SCN SERPL QL: NEGATIVE
EOSINOPHIL # BLD AUTO: 0.18 THOUSAND/ΜL (ref 0–0.61)
EOSINOPHIL NFR BLD AUTO: 2 % (ref 0–6)
ERYTHROCYTE [DISTWIDTH] IN BLOOD BY AUTOMATED COUNT: 13.2 % (ref 11.6–15.1)
GLUCOSE SERPL-MCNC: 111 MG/DL (ref 65–140)
HCT VFR BLD AUTO: 36.2 % (ref 34.8–46.1)
HGB BLD-MCNC: 11.7 G/DL (ref 11.5–15.4)
HOLD SPECIMEN: NORMAL
IMM GRANULOCYTES # BLD AUTO: 0.13 THOUSAND/UL (ref 0–0.2)
IMM GRANULOCYTES NFR BLD AUTO: 1 % (ref 0–2)
LYMPHOCYTES # BLD AUTO: 1.78 THOUSANDS/ΜL (ref 0.6–4.47)
LYMPHOCYTES NFR BLD AUTO: 16 % (ref 14–44)
MCH RBC QN AUTO: 27.9 PG (ref 26.8–34.3)
MCHC RBC AUTO-ENTMCNC: 32.3 G/DL (ref 31.4–37.4)
MCV RBC AUTO: 86 FL (ref 82–98)
MONOCYTES # BLD AUTO: 0.85 THOUSAND/ΜL (ref 0.17–1.22)
MONOCYTES NFR BLD AUTO: 7 % (ref 4–12)
NEUTROPHILS # BLD AUTO: 8.55 THOUSANDS/ΜL (ref 1.85–7.62)
NEUTS SEG NFR BLD AUTO: 74 % (ref 43–75)
NRBC BLD AUTO-RTO: 0 /100 WBCS
PLATELET # BLD AUTO: 234 THOUSANDS/UL (ref 149–390)
PMV BLD AUTO: 10.6 FL (ref 8.9–12.7)
RBC # BLD AUTO: 4.2 MILLION/UL (ref 3.81–5.12)
RH BLD: POSITIVE
RH BLD: POSITIVE
SPECIMEN EXPIRATION DATE: NORMAL
TREPONEMA PALLIDUM IGG+IGM AB [PRESENCE] IN SERUM OR PLASMA BY IMMUNOASSAY: NORMAL
WBC # BLD AUTO: 11.52 THOUSAND/UL (ref 4.31–10.16)

## 2025-01-10 PROCEDURE — 59025 FETAL NON-STRESS TEST: CPT | Performed by: OBSTETRICS & GYNECOLOGY

## 2025-01-10 PROCEDURE — 99222 1ST HOSP IP/OBS MODERATE 55: CPT | Performed by: STUDENT IN AN ORGANIZED HEALTH CARE EDUCATION/TRAINING PROGRAM

## 2025-01-10 PROCEDURE — G0463 HOSPITAL OUTPT CLINIC VISIT: HCPCS

## 2025-01-10 PROCEDURE — 82948 REAGENT STRIP/BLOOD GLUCOSE: CPT

## 2025-01-10 PROCEDURE — 59025 FETAL NON-STRESS TEST: CPT | Performed by: STUDENT IN AN ORGANIZED HEALTH CARE EDUCATION/TRAINING PROGRAM

## 2025-01-10 PROCEDURE — 99213 OFFICE O/P EST LOW 20 MIN: CPT

## 2025-01-10 PROCEDURE — G0379 DIRECT REFER HOSPITAL OBSERV: HCPCS

## 2025-01-10 PROCEDURE — 99214 OFFICE O/P EST MOD 30 MIN: CPT | Performed by: STUDENT IN AN ORGANIZED HEALTH CARE EDUCATION/TRAINING PROGRAM

## 2025-01-10 PROCEDURE — 86780 TREPONEMA PALLIDUM: CPT

## 2025-01-10 PROCEDURE — 86850 RBC ANTIBODY SCREEN: CPT

## 2025-01-10 PROCEDURE — 85025 COMPLETE CBC W/AUTO DIFF WBC: CPT

## 2025-01-10 PROCEDURE — 86900 BLOOD TYPING SEROLOGIC ABO: CPT

## 2025-01-10 PROCEDURE — 76816 OB US FOLLOW-UP PER FETUS: CPT | Performed by: STUDENT IN AN ORGANIZED HEALTH CARE EDUCATION/TRAINING PROGRAM

## 2025-01-10 PROCEDURE — 99215 OFFICE O/P EST HI 40 MIN: CPT | Performed by: OBSTETRICS & GYNECOLOGY

## 2025-01-10 PROCEDURE — 86901 BLOOD TYPING SEROLOGIC RH(D): CPT

## 2025-01-10 PROCEDURE — NC001 PR NO CHARGE: Performed by: OBSTETRICS & GYNECOLOGY

## 2025-01-10 RX ORDER — ONDANSETRON 2 MG/ML
4 INJECTION INTRAMUSCULAR; INTRAVENOUS EVERY 8 HOURS PRN
Status: DISCONTINUED | OUTPATIENT
Start: 2025-01-10 | End: 2025-01-11 | Stop reason: HOSPADM

## 2025-01-10 RX ORDER — ACETAMINOPHEN 325 MG/1
650 TABLET ORAL EVERY 6 HOURS PRN
Status: DISCONTINUED | OUTPATIENT
Start: 2025-01-10 | End: 2025-01-11 | Stop reason: HOSPADM

## 2025-01-10 RX ORDER — INSULIN GLARGINE 100 [IU]/ML
9 INJECTION, SOLUTION SUBCUTANEOUS
Status: COMPLETED | OUTPATIENT
Start: 2025-01-10 | End: 2025-01-10

## 2025-01-10 RX ORDER — MONTELUKAST SODIUM 10 MG/1
10 TABLET ORAL EVERY EVENING
Status: DISCONTINUED | OUTPATIENT
Start: 2025-01-11 | End: 2025-01-10

## 2025-01-10 RX ORDER — ALBUTEROL SULFATE 90 UG/1
2 INHALANT RESPIRATORY (INHALATION) EVERY 6 HOURS PRN
Status: DISCONTINUED | OUTPATIENT
Start: 2025-01-10 | End: 2025-01-11 | Stop reason: HOSPADM

## 2025-01-10 RX ORDER — SODIUM CHLORIDE, SODIUM LACTATE, POTASSIUM CHLORIDE, CALCIUM CHLORIDE 600; 310; 30; 20 MG/100ML; MG/100ML; MG/100ML; MG/100ML
75 INJECTION, SOLUTION INTRAVENOUS CONTINUOUS
Status: DISCONTINUED | OUTPATIENT
Start: 2025-01-10 | End: 2025-01-11 | Stop reason: HOSPADM

## 2025-01-10 RX ADMIN — INSULIN GLARGINE 9 UNITS: 100 INJECTION, SOLUTION SUBCUTANEOUS at 21:42

## 2025-01-10 RX ADMIN — SODIUM CHLORIDE, SODIUM LACTATE, POTASSIUM CHLORIDE, AND CALCIUM CHLORIDE 75 ML/HR: .6; .31; .03; .02 INJECTION, SOLUTION INTRAVENOUS at 22:38

## 2025-01-10 NOTE — PLAN OF CARE
Problem: ANTEPARTUM  Goal: Maintain pregnancy as long as maternal and/or fetal condition is stable  Description: INTERVENTIONS:  - Maternal surveillance  - Fetal surveillance  - Monitor uterine activity  - Medications as ordered  - Bedrest  Outcome: Progressing     Problem: PAIN - ADULT  Goal: Verbalizes/displays adequate comfort level or baseline comfort level  Description: Interventions:  - Encourage patient to monitor pain and request assistance  - Assess pain using appropriate pain scale  - Administer analgesics based on type and severity of pain and evaluate response  - Implement non-pharmacological measures as appropriate and evaluate response  - Consider cultural and social influences on pain and pain management  - Notify physician/advanced practitioner if interventions unsuccessful or patient reports new pain  Outcome: Progressing     Problem: INFECTION - ADULT  Goal: Absence or prevention of progression during hospitalization  Description: INTERVENTIONS:  - Assess and monitor for signs and symptoms of infection  - Monitor lab/diagnostic results  - Monitor all insertion sites, i.e. indwelling lines, tubes, and drains  - Monitor endotracheal if appropriate and nasal secretions for changes in amount and color  - [place] appropriate cooling/warming therapies per order  - Administer medications as ordered  - Instruct and encourage patient and family to use good hand hygiene technique  - Identify and instruct in appropriate isolation precautions for identified infection/condition  Outcome: Progressing  Goal: Absence of fever/infection during neutropenic period  Description: INTERVENTIONS:  - Monitor WBC    Outcome: Progressing     Problem: SAFETY ADULT  Goal: Patient will remain free of falls  Description: INTERVENTIONS:  - Educate patient/family on patient safety including physical limitations  - Instruct patient to call for assistance with activity   - Consult OT/PT to assist with strengthening/mobility   -  Keep Call bell within reach  - Keep bed low and locked with side rails adjusted as appropriate  - Keep care items and personal belongings within reach  - Initiate and maintain comfort rounds  - Make Fall Risk Sign visible to staff  - Offer Toileting every PRN Hours, in advance of need  Outcome: Progressing  Goal: Maintain or return to baseline ADL function  Description: INTERVENTIONS:  -  Assess patient's ability to carry out ADLs; assess patient's baseline for ADL function and identify physical deficits which impact ability to perform ADLs (bathing, care of mouth/teeth, toileting, grooming, dressing, etc.)  - Assess/evaluate cause of self-care deficits   - Assess range of motion  - Assess patient's mobility; develop plan if impaired  - Assess patient's need for assistive devices and provide as appropriate  - Encourage maximum independence but intervene and supervise when necessary  - Involve family in performance of ADLs  - Assess for home care needs following discharge   - Consider OT consult to assist with ADL evaluation and planning for discharge  - Provide patient education as appropriate  Outcome: Progressing  Goal: Maintains/Returns to pre admission functional level  Description: INTERVENTIONS:  - Perform AM-PAC 6 Click Basic Mobility/ Daily Activity assessment daily.  - Set and communicate daily mobility goal to care team and patient/family/caregiver.   - Collaborate with rehabilitation services on mobility goals if consulted  - Perform Range of Motion PRN times a day.  - Reposition patient every PRN hours.  - Dangle patient PRN times a day  - Stand patient PRN times a day  - Ambulate patient PRN times a day  - Out of bed to chair PRN times a day   - Out of bed for meals PRN times a day  - Out of bed for toileting  - Record patient progress and toleration of activity level   Outcome: Progressing     Problem: DISCHARGE PLANNING  Goal: Discharge to home or other facility with appropriate  resources  Description: INTERVENTIONS:  - Identify barriers to discharge w/patient and caregiver  - Arrange for needed discharge resources and transportation as appropriate  - Identify discharge learning needs (meds, wound care, etc.)  - Arrange for interpretive services to assist at discharge as needed  - Refer to Case Management Department for coordinating discharge planning if the patient needs post-hospital services based on physician/advanced practitioner order or complex needs related to functional status, cognitive ability, or social support system  Outcome: Progressing     Problem: Knowledge Deficit  Goal: Patient/family/caregiver demonstrates understanding of disease process, treatment plan, medications, and discharge instructions  Description: Complete learning assessment and assess knowledge base.  Interventions:  - Provide teaching at level of understanding  - Provide teaching via preferred learning methods  Outcome: Progressing

## 2025-01-10 NOTE — PROGRESS NOTES
Repeat Non-Stress Testing:    Patient verbalizes +FM. Pt denies ALL:               Leaking of fluid   Contractions   Vaginal bleeding   Decreased fetal movement    Patient is performing daily kick counts. Patient has no questions or concerns.   NST strip reviewed by Dr. Jc.      @0913 contraction noted on strip with a late, returned to baseline after 90 seconds. Dr. Jc was made aware. Complete ultrasound after non-stress test.

## 2025-01-10 NOTE — PROGRESS NOTES
[facility]: Ms. Cunha was seen today for NST (found under the pregnancy episode) which I reviewed the RN assessment and agree, and fetal growth ultrasound (see ultrasound report under OB procedures tab).         MDM:   I. Diagnoses/Problems addressed:  low  II.  Data:  glucose log, prenatal note , diabetes communication  III.  Risk of morbidity: High    Please don't hesitate to contact our office with any concerns or questions.  -Almaz Jc MD

## 2025-01-10 NOTE — ASSESSMENT & PLAN NOTE
Up to date prenatal care, [facility]  [facility]

## 2025-01-10 NOTE — H&P
"H & P- Obstetrics   Mikala Cunha 29 y.o. female MRN: [id number]  Unit/Bed#: -01 Encounter: [id number]    Assessment: 29 y.o.  at 36w2d patient A2GDM admitted for observation and for a contraction stress.  SVE: 0.5/30/-5  FHT: Reactive 150 bpm, presence of decel during Extended Fetal Monitoring  KRANTHI 7.6 cm  Clinical EFW: 7 lbs 4 oz (86%) 36w4d ; Cephalic confirmed by TAUS  GBS status: Negative   Postpartum contraception plan: Undecided    Plan:   GDM, class A2  Assessment & Plan  Lab Results   Component Value Date    HGBA1C 5.3 2023       No results for input(s): \"POCGLU\" in the last 72 hours.    Blood Sugar Average: Last 72 hrs:    Lantus 9 units SC qD at bedtime        Excessive fetal growth affecting management of mother in third trimester, antepartum  Assessment & Plan  -EFW: 7 lbs 4 oz (86%) 36w4d ;    * Fetal heart rate decelerations affecting management of mother  Assessment & Plan  Observation  Extended Fetal Monitoring  Contraction Stress Test          Discussed case and plan w/ Dr. Mercado      Chief Complaint: sent from [facility] for EFM    HPI: Mikala Cunha is a 29 y.o.  with an FAWN of 2025, Date entered prior to episode creation at 36w2d who is being admitted for EFM and CST. She irregular uterine contractions, has no LOF, and reports no VB. She states that the baby moves as usual.    Patient Active Problem List   Diagnosis    Uterine fibroid in pregnancy    Excessive fetal growth affecting management of mother in third trimester, antepartum    BMI 30.0-30.9,adult    GDM, class A2    36 weeks gestation of pregnancy    Abdominal pain affecting pregnancy    Prenatal care in third trimester    Fetal heart rate decelerations affecting management of mother       Baby complications/comments: none    Review of Systems   Constitutional:  Negative for chills and fever.   HENT:  Negative for ear pain and sore throat.    Eyes:  Negative for pain and visual disturbance.   Respiratory:  " Negative for cough and shortness of breath.    Cardiovascular:  Negative for chest pain and palpitations.   Gastrointestinal:  Negative for abdominal pain, nausea and vomiting.   Genitourinary:  Negative for dysuria, hematuria and vaginal bleeding.   Musculoskeletal:  Negative for arthralgias and back pain.   Skin:  Negative for color change and rash.   Neurological:  Negative for seizures, syncope and headaches.   All other systems reviewed and are negative.      OB Hx:  OB History    Para Term  AB Living   1 0 0 0 0 0   SAB IAB Ectopic Multiple Live Births   0 0 0 0 0      # Outcome Date GA Lbr Edilson/2nd Weight Sex Type Anes PTL Lv   1 Current                Past Medical Hx:  Past Medical History:   Diagnosis Date    Asthma     Varicella        Past Surgical hx:  Past Surgical History:   Procedure Laterality Date    WISDOM TOOTH EXTRACTION         Social Hx:  Alcohol use: No  Tobacco use: No  Other substance use: No    Allergies   Allergen Reactions    Other Itching     DOGS    Latex Itching and Rash         Medications Prior to Admission:     Ascorbic Acid (vitamin C) 100 MG tablet    Famotidine (PEPCID PO)    Ferrous Sulfate (IRON PO)    Lantus SoloStar 100 units/mL SOPN    levocetirizine (XYZAL) 5 MG tablet    montelukast (SINGULAIR) 10 mg tablet    Prenatal Multivit-Min-Fe-FA (PRE- PO)    albuterol (PROVENTIL HFA,VENTOLIN HFA) 90 mcg/act inhaler    Blood Glucose Monitoring Suppl (OneTouch Verio Reflect) w/Device KIT    Continuous Glucose Sensor (Dexcom G7 Sensor)    Insulin Pen Needle 31G X 5 MM MISC    Lancets (OneTouch Delica Plus [id number]) MISC    ondansetron (ZOFRAN) 4 mg tablet    OneTouch Verio test strip    Objective:  Temp:  [98.2 °F (36.8 °C)] 98.2 °F (36.8 °C)  HR:  [] 92  BP: (114-120)/(60-73) 114/73  Resp:  [14] 14  SpO2:  [99 %] 99 %  Body mass index is 31.09 kg/m².     Physical Exam:  Physical Exam  Constitutional:       Appearance: Normal appearance.   Genitourinary:       Vulva normal.   Cardiovascular:      Rate and Rhythm: Normal rate.   Pulmonary:      Effort: Pulmonary effort is normal. No respiratory distress.      Comments: Gravid Uterus  Abdominal:      Palpations: Abdomen is soft.      Tenderness: There is no abdominal tenderness.   Neurological:      General: No focal deficit present.      Mental Status: She is alert and oriented to person, place, and time.   Skin:     General: Skin is warm and dry.   Psychiatric:         Mood and Affect: Mood normal.         Behavior: Behavior normal.   Vitals reviewed.            FHT:  Baseline Rate (FHR): 155 bpm  Variability: Moderate  Accelerations: 15 x 15 or greater, With fetal movement  Decelerations: Variable, Av not <80 bpm, For < 60 seconds  FHR Category: Category II    TOCO:   Contraction Frequency (minutes): 1ctx (1 in 30 mins)  Contraction Duration (seconds):   Contraction Intensity: Mild    Lab Results   Component Value Date    WBC 11.52 (H) 01/10/2025    HGB 11.7 01/10/2025    HCT 36.2 01/10/2025     01/10/2025     Lab Results   Component Value Date    K 3.4 (L) 12/03/2024     12/03/2024    CO2 24 12/03/2024    BUN 10 12/03/2024    CREATININE 0.70 12/03/2024    AST 16 12/03/2024    ALT 13 12/03/2024     Prenatal Labs: Reviewed      Blood type: O positive  Antibody: Negative  GBS: Negative  HIV: Non-reactive  Rubella: Immune  Syphilis IgM/IgG: Non-reactive  HBsAg: Non-reactive  HCAb: Non-reacitve  Chlamydia: negative  Gonorrhea: negative  Platelets: 234  Hgb: 11.5  <2 Midnights  INPATIENT     Signature/Title: Sidra Barajas MD  Date: 1/10/2025  Time: 7:26 PM

## 2025-01-10 NOTE — ASSESSMENT & PLAN NOTE
Plan for continuous fetal monitoring  Consider contraction stress test to be completed 1/11 AM while NPO  NPO status, given potential need for  delivery

## 2025-01-10 NOTE — CONSULTS
Consultation - Maternal Fetal Medicine   Mikala Cunha 29 y.o. female MRN: [id number]  Unit/Bed#: [facility] 5- Encounter: [id number]  Admit date: 1/10/2025.  Today's date: 01/10/25    Assessment & Plan   Ms. Cunha is a 29 y.o. year-old  at 36w2d, Hospital Day: 1, admitted for extendended fetal monitoring.  By issue:    36 weeks gestation of pregnancy  Assessment & Plan  Up to date prenatal care, late transfer of care from [facility]  GBS negative    GDM, class A2  Assessment & Plan  Serial POCT  Home regimen: lantus 18u nightly  Plan for decreased dose of 9u while NPO    Lab Results   Component Value Date    HGBA1C 5.3 2023       Excessive fetal growth affecting management of mother in third trimester, antepartum  Assessment & Plan  MFM U/S 1/10/2025  Vertex presentation  Placenta Location = Posterior, right lateral     MEASUREMENTS (* Included In Average GA)  AC             35.72 cm        39 weeks 4 days* (>99%)  BPD             8.62 cm        34 weeks 6 days* (19%)  HC             31.61 cm        35 weeks 4 days* (9%)  Femur           6.99 cm        35 weeks 6 days* (35%)     EFW Hadlock 4   3287 grams - 7 lbs 4 oz                 (86%)     AMNIOTIC FLUID   Q1: 2.4      Q2: 3.2      Q3: 1.3      Q4: 0.8  KRANTHI Total = 7.6 cm  Amniotic Fluid: Normal    * Fetal heart rate decelerations affecting management of mother  Assessment & Plan  Plan for continuous fetal monitoring  Consider contraction stress test to be completed 1/11 AM while NPO  NPO status, given potential need for  delivery                 Physician Requesting Consult: Niru Mercado MD  Reason for Consult / Principal Problem: fetal monitoring for observed decelerations  Subspeciality: Perinatology    Dear Dr. Mercado,    Thank you for referring patient Mikala Cunha for Maternal-Fetal Medicine consultation regarding fetal decelerations.  HPI: As you know, Ms. Cunha is a 29 y.o. year-old  with an FAWN of Estimated  Date of Delivery: 25 at 36w2d, Hospital Day: 1, admitted for extended fetal montoring for intermittent decelerations.  Antepartum course is significant for A2GDM.    Other obstetric review of symptoms:  Contractions: irregular.    Leakage of fluid: None.    Bleeding: None.    Fetal movement: present.      Review of Systems   Cardiovascular:  Negative for chest pain.   Gastrointestinal:  Negative for abdominal pain.   Genitourinary:  Negative for vaginal bleeding, vaginal discharge and vaginal pain.   All other systems reviewed and are negative.      Other history is as follows:    Historical Information   OB History    Para Term  AB Living   1 0 0 0 0 0   SAB IAB Ectopic Multiple Live Births   0 0 0 0 0      # Outcome Date GA Lbr Edilson/2nd Weight Sex Type Anes PTL Lv   1 Current              Gynecologic history: Patient's last menstrual period was 2024.     Past Medical History:   Diagnosis Date    Asthma     Varicella      Past Surgical History:   Procedure Laterality Date    WISDOM TOOTH EXTRACTION       Social History   Social History     Substance and Sexual Activity   Alcohol Use Not Currently     Social History     Substance and Sexual Activity   Drug Use Never     Social History     Tobacco Use   Smoking Status Never   Smokeless Tobacco Never     Family History:   Family History   Problem Relation Age of Onset    No Known Problems Mother     Skin cancer Father     No Known Problems Sister     No Known Problems Brother     Diabetes Maternal Grandmother     Ovarian cysts Maternal Grandmother     Ovarian cancer Maternal Grandmother     Diabetes Paternal Grandmother        Meds/Allergies   Prior to Admission Medications   Prescriptions Last Dose Informant Patient Reported? Taking?   Ascorbic Acid (vitamin C) 100 MG tablet 1/10/2025  Yes Yes   Sig: Take 100 mg by mouth daily   Blood Glucose Monitoring Suppl (OneTouch Verio Reflect) w/Device KIT   Yes No   Sig: TESTING BLOOD SUGARS 4 X DAILY  "  Continuous Glucose Sensor (Dexcom G7 Sensor)   No No   Sig: Use 1 Device every 10 days Insulin controlled GDM.   Famotidine (PEPCID PO) 1/10/2025  Yes Yes   Sig: Take by mouth   Ferrous Sulfate (IRON PO) 1/10/2025  Yes Yes   Sig: Take 1 tablet by mouth daily   Insulin Pen Needle 31G X 5 MM MISC   No No   Sig: Use with insulin pen once daily at bedtime   Lancets (OneTouch Delica Plus [id number]) MISC   Yes No   Sig: TESTING BLOOD SUGARS 4 X DAILY   Lantus SoloStar 100 units/mL SOPN 2025 Self No Yes   Sig: Inject 10 units once daily at bedtime (9-10pm); Titrate as instructed   OneTouch Verio test strip   Yes No   Prenatal Multivit-Min-Fe-FA (PRE- PO) 1/10/2025  Yes Yes   Sig: Take by mouth   albuterol (PROVENTIL HFA,VENTOLIN HFA) 90 mcg/act inhaler More than a month  Yes No   Sig: Inhale 2 puffs every 6 (six) hours as needed   levocetirizine (XYZAL) 5 MG tablet 1/10/2025 Self Yes Yes   Sig: Take 5 mg by mouth every evening   montelukast (SINGULAIR) 10 mg tablet 1/10/2025 Self Yes Yes   Sig: Take 10 mg by mouth every evening   ondansetron (ZOFRAN) 4 mg tablet   No No   Sig: Take 1 tablet (4 mg total) by mouth every 8 (eight) hours as needed for nausea or vomiting      Facility-Administered Medications: None         Current Facility-Administered Medications:     acetaminophen (TYLENOL) tablet 650 mg, Q6H PRN    albuterol (PROVENTIL HFA,VENTOLIN HFA) inhaler 2 puff, Q6H PRN    insulin glargine (LANTUS) subcutaneous injection 9 Units 0.09 mL, HS    ondansetron (ZOFRAN) injection 4 mg, Q8H PRN  Allergies   Allergen Reactions    Other Itching     DOGS    Latex Itching and Rash       Objective    Patient Vitals for the past 24 hrs:   BP Temp Temp src Pulse Resp SpO2 Height Weight   01/10/25 1302 114/73 98.2 °F (36.8 °C) Oral 92 14 99 % 5' 2\" (1.575 m) 77.1 kg (170 lb)     Vitals: Blood pressure 114/73, pulse 92, temperature 98.2 °F (36.8 °C), temperature source Oral, resp. rate 14, height 5' 2\" (1.575 m), weight " 77.1 kg (170 lb), last menstrual period 04/05/2024, SpO2 99%, not currently breastfeeding.Body mass index is 31.09 kg/m².      General: Well appearing, no distress  Respiratory: Unlabored breathing  Cardiovascular: Regular rate.  Abdomen: Soft, gravid, nontender  Fundal Height: Appropriate for gestational age.  Extremities: Warm and well perfused.  Non tender.    0.5/30/-5 SVE performed by Dr. Broussard on triage evaluation    Prenatal Labs:   HIV: non-reactive  Rubella: immune  Syphilis IgM/IgG: non-reactive  HBsAg: non-reactive  HCAb: non-reactive  Chlamydia: negative  Gonorrhea: negative  Diabetes 1 hour screen: Elevated  3 hr screen: Elevated        Fetal data:  Nonstress test: date 01/10/25 Time: 1500 - 1530  Baseline: 145 bpm  Variability: moderate  Accelerations: present, 15x15  Decelerations: late deceleration at 1515 hrs  Contractions: irregular  Assessment: reactive  Plan: Continuous    MFM ultrasound report key findings:  from 1/10/25 date at 36w2d gestational age.    Fetus # 1 of 1  Vertex presentation  Placenta Location = Posterior, right lateral     MEASUREMENTS (* Included In Average GA)     AC             35.72 cm        39 weeks 4 days* (>99%)  BPD             8.62 cm        34 weeks 6 days* (19%)  HC             31.61 cm        35 weeks 4 days* (9%)  Femur           6.99 cm        35 weeks 6 days* (35%)     EFW Hadlock 4   3287 grams - 7 lbs 4 oz                 (86%)     THE AVERAGE GESTATIONAL AGE is 36 weeks 4 days +/- 21 days.     AMNIOTIC FLUID     Q1: 2.4      Q2: 3.2      Q3: 1.3      Q4: 0.8  KRANTHI Total = 7.6 cm  Amniotic Fluid: Normal    Imaging, EKG, Pathology, and Other Studies: Results Review Statement: No pertinent imaging studies reviewed.          Zeke Terrell,   1/10/2025  1656 hrs

## 2025-01-10 NOTE — ASSESSMENT & PLAN NOTE
Serial POCT  Home regimen: lantus 18u nightly  Plan for decreased dose of 9u while NPO    Lab Results   Component Value Date    HGBA1C 5.3 11/09/2023

## 2025-01-10 NOTE — ASSESSMENT & PLAN NOTE
[facility] U/S 1/10/2025  Vertex presentation  Placenta Location = Posterior, right lateral     MEASUREMENTS (* Included In Average GA)  AC             35.72 cm        39 weeks 4 days* (>99%)  BPD             8.62 cm        34 weeks 6 days* (19%)  HC             31.61 cm        35 weeks 4 days* (9%)  Femur           6.99 cm        35 weeks 6 days* (35%)     EFW Hadlock 4   3287 grams - 7 lbs 4 oz                 (86%)     AMNIOTIC FLUID   Q1: 2.4      Q2: 3.2      Q3: 1.3      Q4: 0.8  KRANTHI Total = 7.6 cm  Amniotic Fluid: Normal

## 2025-01-11 ENCOUNTER — RESULTS FOLLOW-UP (OUTPATIENT)
Dept: OTHER | Facility: HOSPITAL | Age: 30
End: 2025-01-11

## 2025-01-11 VITALS
OXYGEN SATURATION: 97 % | RESPIRATION RATE: 18 BRPM | BODY MASS INDEX: 31.28 KG/M2 | TEMPERATURE: 98 F | WEIGHT: 170 LBS | SYSTOLIC BLOOD PRESSURE: 117 MMHG | DIASTOLIC BLOOD PRESSURE: 81 MMHG | HEART RATE: 86 BPM | HEIGHT: 62 IN

## 2025-01-11 LAB
GLUCOSE SERPL-MCNC: 75 MG/DL (ref 65–140)
GLUCOSE SERPL-MCNC: 80 MG/DL (ref 65–140)
GLUCOSE SERPL-MCNC: 82 MG/DL (ref 65–140)

## 2025-01-11 PROCEDURE — NC001 PR NO CHARGE: Performed by: OBSTETRICS & GYNECOLOGY

## 2025-01-11 PROCEDURE — 99232 SBSQ HOSP IP/OBS MODERATE 35: CPT | Performed by: STUDENT IN AN ORGANIZED HEALTH CARE EDUCATION/TRAINING PROGRAM

## 2025-01-11 PROCEDURE — 82948 REAGENT STRIP/BLOOD GLUCOSE: CPT

## 2025-01-11 RX ORDER — OXYTOCIN/RINGER'S LACTATE 30/500 ML
1-30 PLASTIC BAG, INJECTION (ML) INTRAVENOUS
Status: DISCONTINUED | OUTPATIENT
Start: 2025-01-11 | End: 2025-01-11 | Stop reason: HOSPADM

## 2025-01-11 RX ADMIN — SODIUM CHLORIDE, SODIUM LACTATE, POTASSIUM CHLORIDE, AND CALCIUM CHLORIDE 125 ML/HR: .6; .31; .03; .02 INJECTION, SOLUTION INTRAVENOUS at 09:57

## 2025-01-11 RX ADMIN — OXYTOCIN 2 MILLI-UNITS/MIN: 10 INJECTION INTRAVENOUS at 10:11

## 2025-01-11 NOTE — ASSESSMENT & PLAN NOTE
Patient is a 29-year-old G1, P0 at 36 weeks and 3 days gestation with a GDM who initially presented for fetal decelerations on  fetal surveillance yesterday, 1/10/2025.  Overnight fetal heart tracing appeared reactive with moderate variability and 15 x 15 accelerations and 1 late deceleration.  Plan was made for contraction stress test to occur this morning-we reviewed contractions stress test which will plan for Pitocin titration to allow contractions of 3 and 10 minutes and assess for fetal decelerations.  Patient understands the risks of possible need for delivery via emergent  section versus planned  section if there are more than 50% decelerations for contractions.  We reviewed the risks of  delivery including bleeding, infection, injury to surrounding structures including the bladder, bowel, ureters, nerves and vessels of the pelvis, wound healing complications, VTE.  Delivery consent was obtained.  We also discussed risks of delivering a  fetus at 36 weeks gestation and NICU consultation was offered but patient declined.

## 2025-01-11 NOTE — PROGRESS NOTES
"Progress Note - Maternal-Fetal Medicine   Name: Mikala Cunha 29 y.o. female I MRN: [id number]  Unit/Bed#: -01 I Date of Admission: 1/10/2025   Date of Service: 2025 I Hospital Day: 0     Assessment & Plan  Fetal heart rate decelerations affecting management of mother  Plan for continuous fetal monitoring  Consider contraction stress test to be completed 1/11 AM while NPO  NPO status, given potential need for  delivery  Excessive fetal growth affecting management of mother in third trimester, antepartum  MFM U/S 1/10/2025  Vertex presentation  Placenta Location = Posterior, right lateral     MEASUREMENTS (* Included In Average GA)  AC             35.72 cm        39 weeks 4 days* (>99%)  BPD             8.62 cm        34 weeks 6 days* (19%)  HC             31.61 cm        35 weeks 4 days* (9%)  Femur           6.99 cm        35 weeks 6 days* (35%)     EFW Hadlock 4   3287 grams - 7 lbs 4 oz                 (86%)     AMNIOTIC FLUID   Q1: 2.4      Q2: 3.2      Q3: 1.3      Q4: 0.8  KRANTHI Total = 7.6 cm  Amniotic Fluid: Normal  GDM, class A2  Serial POCT  Home regimen: lantus 18u nightly  Plan for decreased dose of 9u while NPO    Lab Results   Component Value Date    HGBA1C 5.3 2023     36 weeks gestation of pregnancy  Up to date prenatal care, late transfer of care from [facility]  GBS negative      Subjective    Contractions: no, complains of irregular back pain  Loss of fluid: no  Vaginal bleeding: no  Fetal movement: yes    Pain: no  Headaches: no  Visual changes: no  Chest pain: no  Shortness of breath: no  Nausea: no  Vomiting/Diarrhea: no  Dysuria: no  Leg pain: no          Objective      Patient Vitals for the past 24 hrs:   BP Temp Temp src Pulse Resp SpO2 Height Weight   01/10/25 2354 116/84 98 °F (36.7 °C) Oral 90 20 98 % -- --   01/10/25 1934 107/87 98.2 °F (36.8 °C) Oral 104 20 98 % -- --   01/10/25 1302 114/73 98.2 °F (36.8 °C) Oral 92 14 99 % 5' 2\" (1.575 m) 77.1 kg (170 lb) " "      Physical Exam  Vitals reviewed.   Constitutional:       Appearance: Normal appearance.   HENT:      Head: Normocephalic and atraumatic.   Eyes:      Extraocular Movements: Extraocular movements intact.   Cardiovascular:      Rate and Rhythm: Normal rate.      Pulses: Normal pulses.   Pulmonary:      Effort: Pulmonary effort is normal. No respiratory distress.   Abdominal:      Palpations: Abdomen is soft.      Tenderness: There is no abdominal tenderness.      Comments: Gravid uterus   Musculoskeletal:         General: Normal range of motion.      Cervical back: Normal range of motion.   Skin:     General: Skin is warm and dry.   Neurological:      Mental Status: She is alert.   Psychiatric:         Mood and Affect: Mood normal.         Behavior: Behavior normal.         I/O       None            Invasive Devices       Peripheral Intravenous Line  Duration             Peripheral IV 01/10/25 Left;Lateral Wrist <1 day                    Results from last 7 days   Lab Units 01/10/25  1759   WBC Thousand/uL 11.52*   TOTAL NEUT ABS Thousands/µL 8.55*   HEMOGLOBIN g/dL 11.7   MCV fL 86   PLATELETS Thousands/uL 234           Invalid input(s): \"GLU\", \"CA\"      Results from last 7 days   Lab Units 01/10/25  1759   PLATELETS Thousands/uL 234     Results from last 7 days   Lab Units 25  0729 25  0549 01/10/25  1924   POC GLUCOSE mg/dl 82 75 111                       Brief review of pertinent history:  Past Medical History:   Diagnosis Date    Asthma     Varicella      Past Surgical History:   Procedure Laterality Date    WISDOM TOOTH EXTRACTION       OB History    Para Term  AB Living   1 0 0 0 0 0   SAB IAB Ectopic Multiple Live Births   0 0 0 0 0      # Outcome Date GA Lbr Edilson/2nd Weight Sex Type Anes PTL Lv   1 Current              Fetal data:  Nonstress test: date 25 continuous  Baseline:  140 bpm  Variability: moderate  Accelerations: present, 15x15  Decelerations: " absent  Contractions: absent  Assessment: reactive  Plan:  contraction stress test today    [facility] ultrasound report key findings:     1/10/25  Level I     RESULTS     Fetus # 1 of 1  Vertex presentation  Placenta Location = Posterior, right lateral     MEASUREMENTS (* Included In Average GA)     AC             35.72 cm        39 weeks 4 days* (>99%)  BPD             8.62 cm        34 weeks 6 days* (19%)  HC             31.61 cm        35 weeks 4 days* (9%)  Femur           6.99 cm        35 weeks 6 days* (35%)     EFW Hadlock 4   3287 grams - 7 lbs 4 oz                 (86%)     THE AVERAGE GESTATIONAL AGE is 36 weeks 4 days +/- 21 days.     AMNIOTIC FLUID     Q1: 2.4      Q2: 3.2      Q3: 1.3      Q4: 0.8  KRANTHI Total = 7.6 cm  Amniotic Fluid: Normal     ANATOMY COMMENTS     Fetal anatomy has been previously assessed and documented at an outside  Center.     IMPRESSION     Howe IUP  36 weeks and 4 days by this ultrasound. (FAWN=FEB 3 2025)  36 weeks and 2 days by 1st Tri Sono. (FAWN=FEB 5 2025)  Vertex presentation  Regular fetal heart rate of 149 bpm  Posterior, right lateral placenta    MEDS:   Medication Administration - last 24 hours from 01/10/2025 0819 to 01/11/2025 0819         Date/Time Order Dose Route Action Action by     01/10/2025 2142 EST insulin glargine (LANTUS) subcutaneous injection 9 Units 0.09 mL 9 Units Subcutaneous Given Angeles Thompson RN     01/10/2025 2238 EST lactated ringers infusion 75 mL/hr Intravenous New Bag Angeles Thompson RN            Current Facility-Administered Medications:     acetaminophen (TYLENOL) tablet 650 mg, Q6H PRN    albuterol (PROVENTIL HFA,VENTOLIN HFA) inhaler 2 puff, Q6H PRN    lactated ringers infusion, Continuous, Last Rate: 75 mL/hr (01/10/25 2238)    ondansetron (ZOFRAN) injection 4 mg, Q8H PRN         Hannah Sinclair MD  OBGYN, PGY-4  1/11/2025  8:19 AM

## 2025-01-11 NOTE — PLAN OF CARE
Problem: ANTEPARTUM  Goal: Maintain pregnancy as long as maternal and/or fetal condition is stable  Description: INTERVENTIONS:  - Maternal surveillance  - Fetal surveillance  - Monitor uterine activity  - Medications as ordered  - Bedrest  1/11/2025 1022 by Xochitl Wilkinson RN  Outcome: Progressing  1/11/2025 1022 by Xochitl Wilkinson RN  Outcome: Progressing     Problem: PAIN - ADULT  Goal: Verbalizes/displays adequate comfort level or baseline comfort level  Description: Interventions:  - Encourage patient to monitor pain and request assistance  - Assess pain using appropriate pain scale  - Administer analgesics based on type and severity of pain and evaluate response  - Implement non-pharmacological measures as appropriate and evaluate response  - Consider cultural and social influences on pain and pain management  - Notify physician/advanced practitioner if interventions unsuccessful or patient reports new pain  1/11/2025 1022 by Xochitl Wilkinson RN  Outcome: Progressing  1/11/2025 1022 by Xochitl Wilkinson RN  Outcome: Progressing     Problem: INFECTION - ADULT  Goal: Absence or prevention of progression during hospitalization  Description: INTERVENTIONS:  - Assess and monitor for signs and symptoms of infection  - Monitor lab/diagnostic results  - Monitor all insertion sites, i.e. indwelling lines, tubes, and drains  - Monitor endotracheal if appropriate and nasal secretions for changes in amount and color  - [place] appropriate cooling/warming therapies per order  - Administer medications as ordered  - Instruct and encourage patient and family to use good hand hygiene technique  - Identify and instruct in appropriate isolation precautions for identified infection/condition  1/11/2025 1022 by Xochitl Wilkinson RN  Outcome: Progressing  1/11/2025 1022 by Xochitl Wilkinson RN  Outcome: Progressing  Goal: Absence of fever/infection during neutropenic period  Description: INTERVENTIONS:  - Monitor WBC    1/11/2025 1022 by  Xochitl Wilkinson RN  Outcome: Progressing  1/11/2025 1022 by Xochitl Wilkinson RN  Outcome: Progressing     Problem: SAFETY ADULT  Goal: Patient will remain free of falls  Description: INTERVENTIONS:  - Educate patient/family on patient safety including physical limitations  - Instruct patient to call for assistance with activity   - Consult OT/PT to assist with strengthening/mobility   - Keep Call bell within reach  - Keep bed low and locked with side rails adjusted as appropriate  - Keep care items and personal belongings within reach  - Initiate and maintain comfort rounds  - Make Fall Risk Sign visible to staff  - Offer Toileting every PRN Hours, in advance of need  1/11/2025 1022 by Xochitl Wilkinson RN  Outcome: Progressing  1/11/2025 1022 by Xochitl Wilkinson RN  Outcome: Progressing  Goal: Maintain or return to baseline ADL function  Description: INTERVENTIONS:  -  Assess patient's ability to carry out ADLs; assess patient's baseline for ADL function and identify physical deficits which impact ability to perform ADLs (bathing, care of mouth/teeth, toileting, grooming, dressing, etc.)  - Assess/evaluate cause of self-care deficits   - Assess range of motion  - Assess patient's mobility; develop plan if impaired  - Assess patient's need for assistive devices and provide as appropriate  - Encourage maximum independence but intervene and supervise when necessary  - Involve family in performance of ADLs  - Assess for home care needs following discharge   - Consider OT consult to assist with ADL evaluation and planning for discharge  - Provide patient education as appropriate  1/11/2025 1022 by Xochitl Wilkinson RN  Outcome: Progressing  1/11/2025 1022 by Xochitl Wilkinson RN  Outcome: Progressing  Goal: Maintains/Returns to pre admission functional level  Description: INTERVENTIONS:  - Perform AM-PAC 6 Click Basic Mobility/ Daily Activity assessment daily.  - Set and communicate daily mobility goal to care team and  patient/family/caregiver.   - Collaborate with rehabilitation services on mobility goals if consulted  - Perform Range of Motion PRN times a day.  - Reposition patient every PRN hours.  - Dangle patient PRN times a day  - Stand patient PRN times a day  - Ambulate patient PRN times a day  - Out of bed to chair PRN times a day   - Out of bed for meals PRN times a day  - Out of bed for toileting  - Record patient progress and toleration of activity level   1/11/2025 1022 by Xochitl Wilkinson RN  Outcome: Progressing  1/11/2025 1022 by Xochitl Wilkinson RN  Outcome: Progressing     Problem: DISCHARGE PLANNING  Goal: Discharge to home or other facility with appropriate resources  Description: INTERVENTIONS:  - Identify barriers to discharge w/patient and caregiver  - Arrange for needed discharge resources and transportation as appropriate  - Identify discharge learning needs (meds, wound care, etc.)  - Arrange for interpretive services to assist at discharge as needed  - Refer to Case Management Department for coordinating discharge planning if the patient needs post-hospital services based on physician/advanced practitioner order or complex needs related to functional status, cognitive ability, or social support system  1/11/2025 1022 by Xochitl Wilkinson RN  Outcome: Progressing  1/11/2025 1022 by Xochitl Wilkinson RN  Outcome: Progressing     Problem: Knowledge Deficit  Goal: Patient/family/caregiver demonstrates understanding of disease process, treatment plan, medications, and discharge instructions  Description: Complete learning assessment and assess knowledge base.  Interventions:  - Provide teaching at level of understanding  - Provide teaching via preferred learning methods  1/11/2025 1022 by Xochitl Wilkinson RN  Outcome: Progressing  1/11/2025 1022 by Xochitl Wilkinson RN  Outcome: Progressing

## 2025-01-11 NOTE — ASSESSMENT & PLAN NOTE
Lab Results   Component Value Date    HGBA1C 5.3 11/09/2023   Continue home regimen    Recent Labs     01/10/25  1924 01/11/25  0549 01/11/25  0729   POCGLU 111 75 82       Blood Sugar Average: Last 72 hrs:  (P) [id number]

## 2025-01-11 NOTE — PROGRESS NOTES
Progress Note - OB/GYN   Name: Mikala Cunha 29 y.o. female I MRN: [id number]  Unit/Bed#: -01 I Date of Admission: 1/10/2025   Date of Service: 2025 I Hospital Day: 0     Assessment & Plan  Fetal heart rate decelerations affecting management of mother  Patient is a 29-year-old G1, P0 at 36 weeks and 3 days gestation with a GDM who initially presented for fetal decelerations on  fetal surveillance yesterday, 1/10/2025.  Overnight fetal heart tracing appeared reactive with moderate variability and 15 x 15 accelerations and 1 late deceleration.  Plan was made for contraction stress test to occur this morning-we reviewed contractions stress test which will plan for Pitocin titration to allow contractions of 3 and 10 minutes and assess for fetal decelerations.  Patient understands the risks of possible need for delivery via emergent  section versus planned  section if there are more than 50% decelerations for contractions.  We reviewed the risks of  delivery including bleeding, infection, injury to surrounding structures including the bladder, bowel, ureters, nerves and vessels of the pelvis, wound healing complications, VTE.  Delivery consent was obtained.  We also discussed risks of delivering a  fetus at 36 weeks gestation and NICU consultation was offered but patient declined.  Excessive fetal growth affecting management of mother in third trimester, antepartum  Last fetal growth 86 percentile 1/10/2025 with an AC greater than 99th percentile.  GDM, class A2  Lab Results   Component Value Date    HGBA1C 5.3 2023   Continue home regimen    Recent Labs     01/10/25  1924 25  0549 25  0729   POCGLU 111 75 82       Blood Sugar Average: Last 72 hrs:  (P) 89.94319834938815960    36 weeks gestation of pregnancy      24 Hour Events : None  Subjective : Patient reports overall feeling well-anxious for test this morning but denies any  complaints.    Objective :  Temp:  [98 °F (36.7 °C)-98.2 °F (36.8 °C)] 98 °F (36.7 °C)  HR:  [] 80  BP: (107-120)/(60-87) 109/72  Resp:  [14-20] 18  SpO2:  [97 %-99 %] 97 %  O2 Device: None (Room air)    Physical Exam  Vitals reviewed.   Constitutional:       General: She is not in acute distress.     Appearance: Normal appearance. She is not ill-appearing or diaphoretic.   HENT:      Head: Normocephalic and atraumatic.   Cardiovascular:      Rate and Rhythm: Normal rate.   Pulmonary:      Effort: Pulmonary effort is normal. No respiratory distress.   Abdominal:      Palpations: Abdomen is soft.      Tenderness: There is no abdominal tenderness. There is no guarding or rebound.   Musculoskeletal:      Right lower leg: No edema.      Left lower leg: No edema.   Skin:     General: Skin is warm and dry.   Neurological:      Mental Status: She is alert and oriented to person, place, and time.   Psychiatric:         Mood and Affect: Mood normal.         Behavior: Behavior normal.

## 2025-01-11 NOTE — ASSESSMENT & PLAN NOTE
Lab Results   Component Value Date    HGBA1C 5.3 11/09/2023       Recent Labs     01/10/25  1924 01/11/25  0549 01/11/25  0729   POCGLU 111 75 82       Blood Sugar Average: Last 72 hrs:  (P) 89.47394298813371146  Lantus 9 units SC qD at bedtime

## 2025-01-11 NOTE — PLAN OF CARE
Problem: ANTEPARTUM  Goal: Maintain pregnancy as long as maternal and/or fetal condition is stable  Description: INTERVENTIONS:  - Maternal surveillance  - Fetal surveillance  - Monitor uterine activity  - Medications as ordered  - Bedrest  Outcome: Progressing     Problem: PAIN - ADULT  Goal: Verbalizes/displays adequate comfort level or baseline comfort level  Description: Interventions:  - Encourage patient to monitor pain and request assistance  - Assess pain using appropriate pain scale  - Administer analgesics based on type and severity of pain and evaluate response  - Implement non-pharmacological measures as appropriate and evaluate response  - Consider cultural and social influences on pain and pain management  - Notify physician/advanced practitioner if interventions unsuccessful or patient reports new pain  Outcome: Progressing     Problem: INFECTION - ADULT  Goal: Absence or prevention of progression during hospitalization  Description: INTERVENTIONS:  - Assess and monitor for signs and symptoms of infection  - Monitor lab/diagnostic results  - Monitor all insertion sites, i.e. indwelling lines, tubes, and drains  - Monitor endotracheal if appropriate and nasal secretions for changes in amount and color  - [place] appropriate cooling/warming therapies per order  - Administer medications as ordered  - Instruct and encourage patient and family to use good hand hygiene technique  - Identify and instruct in appropriate isolation precautions for identified infection/condition  Outcome: Progressing  Goal: Absence of fever/infection during neutropenic period  Description: INTERVENTIONS:  - Monitor WBC    Outcome: Progressing     Problem: SAFETY ADULT  Goal: Patient will remain free of falls  Description: INTERVENTIONS:  - Educate patient/family on patient safety including physical limitations  - Instruct patient to call for assistance with activity   - Consult OT/PT to assist with strengthening/mobility   -  Keep Call bell within reach  - Keep bed low and locked with side rails adjusted as appropriate  - Keep care items and personal belongings within reach  - Initiate and maintain comfort rounds  - Make Fall Risk Sign visible to staff  - Offer Toileting every PRN Hours, in advance of need  Outcome: Progressing  Goal: Maintain or return to baseline ADL function  Description: INTERVENTIONS:  -  Assess patient's ability to carry out ADLs; assess patient's baseline for ADL function and identify physical deficits which impact ability to perform ADLs (bathing, care of mouth/teeth, toileting, grooming, dressing, etc.)  - Assess/evaluate cause of self-care deficits   - Assess range of motion  - Assess patient's mobility; develop plan if impaired  - Assess patient's need for assistive devices and provide as appropriate  - Encourage maximum independence but intervene and supervise when necessary  - Involve family in performance of ADLs  - Assess for home care needs following discharge   - Consider OT consult to assist with ADL evaluation and planning for discharge  - Provide patient education as appropriate  Outcome: Progressing  Goal: Maintains/Returns to pre admission functional level  Description: INTERVENTIONS:  - Perform AM-PAC 6 Click Basic Mobility/ Daily Activity assessment daily.  - Set and communicate daily mobility goal to care team and patient/family/caregiver.   - Collaborate with rehabilitation services on mobility goals if consulted  - Perform Range of Motion PRN times a day.  - Reposition patient every PRN hours.  - Dangle patient PRN times a day  - Stand patient PRN times a day  - Ambulate patient PRN times a day  - Out of bed to chair PRN times a day   - Out of bed for meals PRN times a day  - Out of bed for toileting  - Record patient progress and toleration of activity level   Outcome: Progressing     Problem: DISCHARGE PLANNING  Goal: Discharge to home or other facility with appropriate  resources  Description: INTERVENTIONS:  - Identify barriers to discharge w/patient and caregiver  - Arrange for needed discharge resources and transportation as appropriate  - Identify discharge learning needs (meds, wound care, etc.)  - Arrange for interpretive services to assist at discharge as needed  - Refer to Case Management Department for coordinating discharge planning if the patient needs post-hospital services based on physician/advanced practitioner order or complex needs related to functional status, cognitive ability, or social support system  Outcome: Progressing     Problem: Knowledge Deficit  Goal: Patient/family/caregiver demonstrates understanding of disease process, treatment plan, medications, and discharge instructions  Description: Complete learning assessment and assess knowledge base.  Interventions:  - Provide teaching at level of understanding  - Provide teaching via preferred learning methods  Outcome: Progressing

## 2025-01-11 NOTE — QUICK NOTE
Contraction stress test completed:  Pitocin was started at 1032 an patient was noted to initiate contractions that were> 3 mins in 10 minutes.  Fetal heart tracing was reviewed in entirety: baseline was noted to be 145 with moderate variability and spontaneous numerous 15 x 15 accelerations with no decelerations noted during the duration of the test.  Pitocin was discontinued at 1103.  Patient was feeling mild contractions-will allow for hydration and continue monitoring until feeling improved and plan for discharge home with review of labor precautions and fetal kick counts.  She has follow-up scheduled with maternal-fetal medicine and our office on Tuesday, 1/14/2025 nonstress test and KRANTHI.    Alexandra Mcgrath MD  01/11/25  11:29 AM

## 2025-01-14 ENCOUNTER — ULTRASOUND (OUTPATIENT)
Dept: OBGYN CLINIC | Facility: CLINIC | Age: 30
End: 2025-01-14
Payer: COMMERCIAL

## 2025-01-14 VITALS
HEIGHT: 62 IN | SYSTOLIC BLOOD PRESSURE: 118 MMHG | DIASTOLIC BLOOD PRESSURE: 76 MMHG | BODY MASS INDEX: 31.83 KG/M2 | WEIGHT: 173 LBS

## 2025-01-14 DIAGNOSIS — O24.419 GDM, CLASS A2: Primary | ICD-10-CM

## 2025-01-14 PROCEDURE — PNV: Performed by: STUDENT IN AN ORGANIZED HEALTH CARE EDUCATION/TRAINING PROGRAM

## 2025-01-14 PROCEDURE — 76815 OB US LIMITED FETUS(S): CPT | Performed by: STUDENT IN AN ORGANIZED HEALTH CARE EDUCATION/TRAINING PROGRAM

## 2025-01-14 PROCEDURE — 59025 FETAL NON-STRESS TEST: CPT | Performed by: STUDENT IN AN ORGANIZED HEALTH CARE EDUCATION/TRAINING PROGRAM

## 2025-01-14 NOTE — PROGRESS NOTES
OB/GYN  PN Visit  Mikala Cunha  [id number]  2025  3:53 PM  Alexandra Mcgrath MD    S: 29 y.o.  36w6d here for PN visit and NST/KRANTHI-she notes some occasional contraction [place].  Denies any loss of fluid or vaginal bleeding.  She endorses good fetal movement.  She notes some edema of the feet and hands.    Pregnancy is complicated by A2 GDM and fibroids.  She had a recent admission in the hospital for observation for decelerations on NST for which she had a contraction stress test completed and passed with flying colors.    O:  Vitals:    25 1400   BP: 118/76       Gen: no acute distress, nonlabored breathing  ST today is reactive with a baseline of 140s, moderate variability and multiple 15 x 15 accelerations with no decelerations noted.  No contractions noted on tocometry.  On transabdominal ultrasound for fetus remains in vertex presentation with an KRANTHI of 13.3 cm.  Gross fetal movement was noted on ultrasound.    A/P:  #1. 36w6d GESTATION  -Continue PNV  - Labor precautions reviewed  - Fetal kick counts reviewed  - Labs: UTD  - Genetics: neg CF and SMN carrier, normal NIPS   - Ultrasounds: EFW 86%ile, AC 99%ile on 1/10/2025  - Tdap: Done 11/15/24   - Flu Shot: Declined  - RSV: received at 32 weeks  - COVID: Unvaccinated  - Rhogam: N/A  - Delivery:  with epidural-reviewed recommendations for induction of labor at at least 39 weeks gestation pending glucose control.  She will contact the office if she has increasing insulin needs which can mount further discussion of if delivery needs to occur sooner at 38 weeks gestation.  - Contraception: None  - Breastfeeding: Yes; breast; has a pump at home  - Pediatrician: Planning to establish with her peds office  -GBS: Negative  - RTO in 1 week for prenatal visit and NST/KRANTHI       #2. A2GDM  Currently on 18U Lantus but still noticing some elevated 6 throughout the day-encouraged her to follow-up with her diabetic educators this week and  she has an appointment at [facility] this Friday.  Continue twice weekly NSTs and KRANTHI-today's NST and KRANTHI were within normal limits.        Alexandra Mcgrath MD  1/14/2025  3:53 PM

## 2025-01-15 ENCOUNTER — TELEPHONE (OUTPATIENT)
Dept: OBGYN CLINIC | Facility: CLINIC | Age: 30
End: 2025-01-15

## 2025-01-15 NOTE — TELEPHONE ENCOUNTER
Patient had called back to inform her dad is having surgery in [facility] on 1/30/2025 and will still be in hospital on 1/31/2025.  She is inquiring about rescheduling IOL to 1/27 8 pm into 1/28/2025 (which is available @ present time per Daysia S ([facility]).  She will be 38 wk 5 days on 1/27/2025.  She was to be scheduled @ 39 wk for A2GDM.  She is 39 wk on 1/29/2025 & Dr Cagle is on call that day.  She also will not have her primary insurance after end 1/2025.  ESC message sent to Dr Mcgrath who saw patient on 1/14/2025.  she will follow up with MFM to inquire about earlier delivery date and update after speaking with them.  Recalled patient & updated.

## 2025-01-15 NOTE — TELEPHONE ENCOUNTER
----- Message from Alexandra Mcgrath MD sent at 1/14/2025  3:52 PM EST -----  Regarding: Induction of labor for A2 GDM.  Please help patient's schedule an induction of labor at 39 weeks gestation in the setting of A2 GDM.  She will be best for p.m. induction with cervical ripening.

## 2025-01-15 NOTE — TELEPHONE ENCOUNTER
ESC message sent to Amrita RASMUSSEN ([facility]) to check availability for IOL 1/30 8pm ripening into 1/31/2025 (EDC 2/5/2025).  Patient has insurance coverage until end 11/2025

## 2025-01-15 NOTE — TELEPHONE ENCOUNTER
Patient scheduled for IOL on 1/30/2025 @ 8 pm into 1/31/2025.  Pink sticky note updated.  Staff message sent to Dr Fregoso & Dr Barger.  Patient informed with instructions given.  Advised to keep appointment as scheduled up until induction date.

## 2025-01-15 NOTE — TELEPHONE ENCOUNTER
Left message patient's VM re: preference date for IOL with ripening @ 39 wk which is 1/29/2025 or later (EDC 2/5/2025)

## 2025-01-16 PROBLEM — Z3A.36 36 WEEKS GESTATION OF PREGNANCY: Status: RESOLVED | Noted: 2024-12-27 | Resolved: 2025-01-16

## 2025-01-17 ENCOUNTER — TELEPHONE (OUTPATIENT)
Age: 30
End: 2025-01-17

## 2025-01-17 ENCOUNTER — ULTRASOUND (OUTPATIENT)
Dept: PERINATAL CARE | Facility: CLINIC | Age: 30
End: 2025-01-17
Payer: COMMERCIAL

## 2025-01-17 VITALS
HEART RATE: 98 BPM | WEIGHT: 173 LBS | BODY MASS INDEX: 31.83 KG/M2 | SYSTOLIC BLOOD PRESSURE: 122 MMHG | DIASTOLIC BLOOD PRESSURE: 78 MMHG | HEIGHT: 62 IN

## 2025-01-17 DIAGNOSIS — O24.419 GDM, CLASS A2: ICD-10-CM

## 2025-01-17 DIAGNOSIS — Z3A.37 37 WEEKS GESTATION OF PREGNANCY: Primary | ICD-10-CM

## 2025-01-17 PROBLEM — O99.810 HYPERGLYCEMIA DURING PREGNANCY: Status: ACTIVE | Noted: 2025-01-17

## 2025-01-17 PROCEDURE — 76815 OB US LIMITED FETUS(S): CPT | Performed by: OBSTETRICS & GYNECOLOGY

## 2025-01-17 PROCEDURE — 59025 FETAL NON-STRESS TEST: CPT | Performed by: OBSTETRICS & GYNECOLOGY

## 2025-01-17 PROCEDURE — 99213 OFFICE O/P EST LOW 20 MIN: CPT | Performed by: OBSTETRICS & GYNECOLOGY

## 2025-01-17 NOTE — TELEPHONE ENCOUNTER
Patient informed Dr Mcgrath has not heard back from [facility] at this point & is inclined to leave IOL as scheduled.  Informed patient will update if any further info received.  Patient also has appointment with [facility] today for KRANTHI/NST.  She will also follow up with [facility] @appointment.

## 2025-01-17 NOTE — LETTER
"   Date: 2025    Alexandra Mcgrath MD  [address] [facility] [id number]    Patient: Mikala Cunha   YOB: 1995   Date of Visit: 2025   Gestational age 37w2d   Nature of this communication: Priority: Discussed delivery timing at her request, I am fine with 38 week induction for suboptimal glycemic control for GDMA2. She is trying to plan delivery around timing of dad's upcoming surgery at [place]. They requested 25 induction if possible, I did not give any guarantee but said I would convey the request. Thank you.        This patient was seen recently in our  office.  Please see ultrasound report under \"OB Procedures\" tab.  Please don't hesitate to contact our office with any concerns or questions.      Sincerely,      Ann Ponce MD  Attending Physician, Maternal-Fetal Medicine  [facility]      "

## 2025-01-17 NOTE — PROGRESS NOTES
Repeat Non-Stress Testing:    Patient verbalizes +FM. Pt denies ALL:               Leaking of fluid   Contractions   Vaginal bleeding   Decreased fetal movement    Patient is performing daily kick counts. Patient has no questions or concerns.   NST strip reviewed by Dr. Ponce.

## 2025-01-20 NOTE — TELEPHONE ENCOUNTER
Placed call to patient to review confirmed delivery recommendations, and to review patient scheduling preferences within recommended delivery window. Patient prefers scheduled 1/26/25 PM, if available. IF absolutely no availability then patient is OK to schedule 1/27/25 PM and would request continued monitoring of availability for 1/26/25 if were to become available. Patient had no questions at this time.    ESC sent to MNW at L&D to request IOL scheduling.

## 2025-01-20 NOTE — TELEPHONE ENCOUNTER
No response via ESC. Placed call to L&D, spoke to Kd and scheduled IOL 1/26/25 at 8pm, as indicated for A2GDm with suboptimal glycemic control per MFM recommendations.   Placed call to patient, reviewed/confirmed IOL details and instructions. Patient aware to maintain appt in office as scheduled 1/23/25 and to call if any questions or concerns prior to scheduled delivery date. Patient had no questions at this time.  Staff message sent to provider, calendar and chart updated.

## 2025-01-20 NOTE — TELEPHONE ENCOUNTER
Received: Today  Alexandra Mcgrath MD  P Ob Navigator [facility]  Can we please help Mikala move up her IOL to 38 weeks gestation! She prefers PM 1/26 for cervical ripening into Monday, 1/27.    Thank you!  Alexandra Mcgrath MD          Reviewed [facility] documentation 1/17/25 with delivery recommendations for 38 weeks.

## 2025-01-21 NOTE — TELEPHONE ENCOUNTER
ESC message sent to Amelia HARRINGTON ([facility]) regarding earlier induction with pm ripening per Dr Barger per MFM recommendation as early as 1/22/2025 due to poor glycemic control (requiring fasting insulin) & evolving macrosomia.

## 2025-01-21 NOTE — TELEPHONE ENCOUNTER
Patient rescheduled for IOL to 1/22/2025 @ 3pm (confirmed ok with Dr Mcgrath) via [facility] (Amelia F [facility] L[facility]D).  Patient informed with instructions given.  Pink sticky note updated.  Staff message sent to Dr Mcgrath & Dr Ospina/

## 2025-01-22 ENCOUNTER — HOSPITAL ENCOUNTER (INPATIENT)
Facility: HOSPITAL | Age: 30
LOS: 2 days | Discharge: HOME | End: 2025-01-24
Attending: STUDENT IN AN ORGANIZED HEALTH CARE EDUCATION/TRAINING PROGRAM | Admitting: STUDENT IN AN ORGANIZED HEALTH CARE EDUCATION/TRAINING PROGRAM
Payer: COMMERCIAL

## 2025-01-22 ENCOUNTER — HOSPITAL ENCOUNTER (OUTPATIENT)
Dept: LABOR AND DELIVERY | Facility: HOSPITAL | Age: 30
Discharge: HOME | End: 2025-01-22
Payer: COMMERCIAL

## 2025-01-22 DIAGNOSIS — O24.419 GDM, CLASS A2: Primary | ICD-10-CM

## 2025-01-22 PROBLEM — Z3A.38 38 WEEKS GESTATION OF PREGNANCY: Status: ACTIVE | Noted: 2025-01-22

## 2025-01-22 LAB
ABO GROUP BLD: NORMAL
BLD GP AB SCN SERPL QL: NEGATIVE
ERYTHROCYTE [DISTWIDTH] IN BLOOD BY AUTOMATED COUNT: 13.5 % (ref 11.6–15.1)
GLUCOSE SERPL-MCNC: 82 MG/DL (ref 65–140)
GLUCOSE SERPL-MCNC: 94 MG/DL (ref 65–140)
HCT VFR BLD AUTO: 34.6 % (ref 34.8–46.1)
HGB BLD-MCNC: 11.2 G/DL (ref 11.5–15.4)
HOLD SPECIMEN: NORMAL
MCH RBC QN AUTO: 27.8 PG (ref 26.8–34.3)
MCHC RBC AUTO-ENTMCNC: 32.4 G/DL (ref 31.4–37.4)
MCV RBC AUTO: 86 FL (ref 82–98)
PLATELET # BLD AUTO: 190 THOUSANDS/UL (ref 149–390)
PMV BLD AUTO: 11 FL (ref 8.9–12.7)
RBC # BLD AUTO: 4.03 MILLION/UL (ref 3.81–5.12)
RH BLD: POSITIVE
SPECIMEN EXPIRATION DATE: NORMAL
WBC # BLD AUTO: 9.5 THOUSAND/UL (ref 4.31–10.16)

## 2025-01-22 PROCEDURE — 86900 BLOOD TYPING SEROLOGIC ABO: CPT

## 2025-01-22 PROCEDURE — NC001 PR NO CHARGE: Performed by: STUDENT IN AN ORGANIZED HEALTH CARE EDUCATION/TRAINING PROGRAM

## 2025-01-22 PROCEDURE — 85027 COMPLETE CBC AUTOMATED: CPT

## 2025-01-22 PROCEDURE — 82948 REAGENT STRIP/BLOOD GLUCOSE: CPT

## 2025-01-22 PROCEDURE — 86780 TREPONEMA PALLIDUM: CPT

## 2025-01-22 PROCEDURE — 86901 BLOOD TYPING SEROLOGIC RH(D): CPT

## 2025-01-22 PROCEDURE — 86850 RBC ANTIBODY SCREEN: CPT

## 2025-01-22 RX ORDER — BUPIVACAINE HYDROCHLORIDE 2.5 MG/ML
30 INJECTION, SOLUTION EPIDURAL; INFILTRATION; INTRACAUDAL ONCE AS NEEDED
Status: DISCONTINUED | OUTPATIENT
Start: 2025-01-22 | End: 2025-01-23

## 2025-01-22 RX ORDER — SODIUM CHLORIDE, SODIUM LACTATE, POTASSIUM CHLORIDE, CALCIUM CHLORIDE 600; 310; 30; 20 MG/100ML; MG/100ML; MG/100ML; MG/100ML
125 INJECTION, SOLUTION INTRAVENOUS CONTINUOUS
Status: DISCONTINUED | OUTPATIENT
Start: 2025-01-22 | End: 2025-01-23

## 2025-01-22 RX ORDER — BUTORPHANOL TARTRATE 1 MG/ML
1 INJECTION, SOLUTION INTRAMUSCULAR; INTRAVENOUS
Status: DISCONTINUED | OUTPATIENT
Start: 2025-01-22 | End: 2025-01-23

## 2025-01-22 RX ORDER — ONDANSETRON 2 MG/ML
4 INJECTION INTRAMUSCULAR; INTRAVENOUS EVERY 6 HOURS PRN
Status: DISCONTINUED | OUTPATIENT
Start: 2025-01-22 | End: 2025-01-23

## 2025-01-22 RX ORDER — ALBUTEROL SULFATE 90 UG/1
2 INHALANT RESPIRATORY (INHALATION) EVERY 6 HOURS PRN
Status: DISCONTINUED | OUTPATIENT
Start: 2025-01-22 | End: 2025-01-24 | Stop reason: HOSPADM

## 2025-01-22 RX ORDER — INSULIN GLARGINE 100 [IU]/ML
5 INJECTION, SOLUTION SUBCUTANEOUS
Status: DISCONTINUED | OUTPATIENT
Start: 2025-01-22 | End: 2025-01-23

## 2025-01-22 RX ORDER — ONDANSETRON 2 MG/ML
4 INJECTION INTRAMUSCULAR; INTRAVENOUS ONCE
Status: COMPLETED | OUTPATIENT
Start: 2025-01-22 | End: 2025-01-22

## 2025-01-22 RX ADMIN — BUTORPHANOL TARTRATE 1 MG: 1 INJECTION, SOLUTION INTRAMUSCULAR; INTRAVENOUS at 21:11

## 2025-01-22 RX ADMIN — INSULIN GLARGINE 5 UNITS: 100 INJECTION, SOLUTION SUBCUTANEOUS at 23:05

## 2025-01-22 RX ADMIN — Medication 50 MCG: at 18:12

## 2025-01-22 RX ADMIN — ONDANSETRON 4 MG: 2 INJECTION INTRAMUSCULAR; INTRAVENOUS at 21:07

## 2025-01-22 NOTE — OB LABOR/OXYTOCIN SAFETY PROGRESS
Labor Progress Note - Mikala Cunha 29 y.o. female MRN: [id number]    Unit/Bed#: -01 Encounter: [id number]             Uterine Activity Characteristics: Irritability  Cervical Dilation: Fingertip        Cervical Effacement: 50  Fetal Station: -3  Baseline Rate (FHR): 125 bpm  Fetal Heart Rate (FHT): 138 BPM  FHR Category: I               Vital Signs:   Vitals:    01/22/25 1612   BP: 119/81   Pulse:    Resp:    Temp:    SpO2:        Notes/comments:   PROCEDURE:  PINEDO BALLOON PLACEMENT    A 24F pinedo with a 30cc balloon was selected, a SVE was performed and the cervix was located. A pinedo balloon was introduced over sterile gloved hands. Balloon advanced through cervix beyond the internal cervical os. A small amount amount of sterile saline solution was instilled in the balloon to confirm placement. Placement was confirmed to be beyond the internal cervical os. A total of 60cc of sterile saline solution was placed into the balloon. Pt tolerated well. Instructions left with RN to place pinedo to gravity with a 1L bag of IV fluid. Notify DO/MD when pinedo dislodged.    Dr. Mcgrath aware.     Almaz Muhammad MD 1/22/2025 4:58 PM

## 2025-01-22 NOTE — ASSESSMENT & PLAN NOTE
POC glucose q1h   Home regimen: lantus 10u qhs    Lab Results   Component Value Date    HGBA1C 5.3 11/09/2023

## 2025-01-22 NOTE — ASSESSMENT & PLAN NOTE
Admit to L&D  CBC, RPR, Type & Screen  Clear liquid diet  SVE: 0.5/50/-3  GBS status: negative   Analgesia at maternal request  Start with orozco balloon; cytotec pending contractions

## 2025-01-23 ENCOUNTER — ANESTHESIA EVENT (INPATIENT)
Age: 30
End: 2025-01-23
Payer: COMMERCIAL

## 2025-01-23 PROBLEM — O41.1290 CHORIOAMNIONITIS: Status: ACTIVE | Noted: 2025-01-23

## 2025-01-23 LAB
BASE EXCESS BLDCOA CALC-SCNC: -5.1 MMOL/L (ref 3–11)
BASE EXCESS BLDCOV CALC-SCNC: -5.3 MMOL/L (ref 1–9)
GLUCOSE SERPL-MCNC: 102 MG/DL (ref 65–140)
GLUCOSE SERPL-MCNC: 111 MG/DL (ref 65–140)
GLUCOSE SERPL-MCNC: 81 MG/DL (ref 65–140)
GLUCOSE SERPL-MCNC: 84 MG/DL (ref 65–140)
GLUCOSE SERPL-MCNC: 85 MG/DL (ref 65–140)
GLUCOSE SERPL-MCNC: 86 MG/DL (ref 65–140)
GLUCOSE SERPL-MCNC: 87 MG/DL (ref 65–140)
GLUCOSE SERPL-MCNC: 88 MG/DL (ref 65–140)
GLUCOSE SERPL-MCNC: 88 MG/DL (ref 65–140)
GLUCOSE SERPL-MCNC: 89 MG/DL (ref 65–140)
HCO3 BLDCOA-SCNC: 21.3 MMOL/L (ref 17.3–27.3)
HCO3 BLDCOV-SCNC: 19.4 MMOL/L (ref 12.2–28.6)
O2 CT VFR BLDCOA CALC: 8 ML/DL
OXYHGB MFR BLDCOA: 40.8 %
OXYHGB MFR BLDCOV: 71.7 %
PCO2 BLDCOA: 44.2 MM[HG] (ref 30–60)
PCO2 BLDCOV: 35.3 MM HG (ref 27–43)
PH BLDCOA: 7.3 [PH] (ref 7.23–7.43)
PH BLDCOV: 7.36 [PH] (ref 7.19–7.49)
PO2 BLDCOA: 19.9 MM HG (ref 5–25)
PO2 BLDCOV: 30.7 MM HG (ref 15–45)
SAO2 % BLDCOV: 14.9 ML/DL
TREPONEMA PALLIDUM IGG+IGM AB [PRESENCE] IN SERUM OR PLASMA BY IMMUNOASSAY: NORMAL

## 2025-01-23 PROCEDURE — 0KQM0ZZ REPAIR PERINEUM MUSCLE, OPEN APPROACH: ICD-10-PCS | Performed by: STUDENT IN AN ORGANIZED HEALTH CARE EDUCATION/TRAINING PROGRAM

## 2025-01-23 PROCEDURE — 82948 REAGENT STRIP/BLOOD GLUCOSE: CPT

## 2025-01-23 PROCEDURE — 3E033VJ INTRODUCTION OF OTHER HORMONE INTO PERIPHERAL VEIN, PERCUTANEOUS APPROACH: ICD-10-PCS | Performed by: STUDENT IN AN ORGANIZED HEALTH CARE EDUCATION/TRAINING PROGRAM

## 2025-01-23 PROCEDURE — 3E0DXGC INTRODUCTION OF OTHER THERAPEUTIC SUBSTANCE INTO MOUTH AND PHARYNX, EXTERNAL APPROACH: ICD-10-PCS | Performed by: STUDENT IN AN ORGANIZED HEALTH CARE EDUCATION/TRAINING PROGRAM

## 2025-01-23 PROCEDURE — 88307 TISSUE EXAM BY PATHOLOGIST: CPT | Performed by: PATHOLOGY

## 2025-01-23 PROCEDURE — 82805 BLOOD GASES W/O2 SATURATION: CPT | Performed by: STUDENT IN AN ORGANIZED HEALTH CARE EDUCATION/TRAINING PROGRAM

## 2025-01-23 PROCEDURE — 59410 OBSTETRICAL CARE: CPT | Performed by: STUDENT IN AN ORGANIZED HEALTH CARE EDUCATION/TRAINING PROGRAM

## 2025-01-23 RX ORDER — ACETAMINOPHEN 160 MG/5ML
650 SUSPENSION ORAL EVERY 6 HOURS PRN
Status: DISCONTINUED | OUTPATIENT
Start: 2025-01-23 | End: 2025-01-23

## 2025-01-23 RX ORDER — OXYTOCIN/RINGER'S LACTATE 30/500 ML
1-30 PLASTIC BAG, INJECTION (ML) INTRAVENOUS
Status: DISCONTINUED | OUTPATIENT
Start: 2025-01-23 | End: 2025-01-23

## 2025-01-23 RX ORDER — LORATADINE 10 MG/1
5 TABLET ORAL DAILY
Status: DISCONTINUED | OUTPATIENT
Start: 2025-01-24 | End: 2025-01-24 | Stop reason: HOSPADM

## 2025-01-23 RX ORDER — CALCIUM CARBONATE 500 MG/1
1000 TABLET, CHEWABLE ORAL DAILY PRN
Status: DISCONTINUED | OUTPATIENT
Start: 2025-01-23 | End: 2025-01-24 | Stop reason: HOSPADM

## 2025-01-23 RX ORDER — ONDANSETRON 2 MG/ML
4 INJECTION INTRAMUSCULAR; INTRAVENOUS EVERY 8 HOURS PRN
Status: DISCONTINUED | OUTPATIENT
Start: 2025-01-23 | End: 2025-01-24 | Stop reason: HOSPADM

## 2025-01-23 RX ORDER — METOCLOPRAMIDE HYDROCHLORIDE 5 MG/ML
10 INJECTION INTRAMUSCULAR; INTRAVENOUS EVERY 6 HOURS PRN
Status: DISCONTINUED | OUTPATIENT
Start: 2025-01-23 | End: 2025-01-23

## 2025-01-23 RX ORDER — DEXTROSE MONOHYDRATE AND SODIUM CHLORIDE 5; .9 G/100ML; G/100ML
100 INJECTION, SOLUTION INTRAVENOUS CONTINUOUS
Status: DISCONTINUED | OUTPATIENT
Start: 2025-01-23 | End: 2025-01-23

## 2025-01-23 RX ORDER — BENZOCAINE/MENTHOL 6 MG-10 MG
1 LOZENGE MUCOUS MEMBRANE DAILY PRN
Status: DISCONTINUED | OUTPATIENT
Start: 2025-01-23 | End: 2025-01-24 | Stop reason: HOSPADM

## 2025-01-23 RX ORDER — MONTELUKAST SODIUM 10 MG/1
10 TABLET ORAL EVERY EVENING
Status: DISCONTINUED | OUTPATIENT
Start: 2025-01-23 | End: 2025-01-24 | Stop reason: HOSPADM

## 2025-01-23 RX ORDER — ACETAMINOPHEN 160 MG/5ML
650 SUSPENSION ORAL EVERY 6 HOURS PRN
Status: DISCONTINUED | OUTPATIENT
Start: 2025-01-23 | End: 2025-01-24 | Stop reason: HOSPADM

## 2025-01-23 RX ORDER — IBUPROFEN 100 MG/5ML
600 SUSPENSION ORAL EVERY 6 HOURS SCHEDULED
Status: DISCONTINUED | OUTPATIENT
Start: 2025-01-23 | End: 2025-01-24 | Stop reason: HOSPADM

## 2025-01-23 RX ORDER — SIMETHICONE 80 MG
80 TABLET,CHEWABLE ORAL 4 TIMES DAILY PRN
Status: DISCONTINUED | OUTPATIENT
Start: 2025-01-23 | End: 2025-01-24 | Stop reason: HOSPADM

## 2025-01-23 RX ORDER — ACETAMINOPHEN 325 MG/1
650 TABLET ORAL EVERY 6 HOURS PRN
Status: DISCONTINUED | OUTPATIENT
Start: 2025-01-23 | End: 2025-01-23

## 2025-01-23 RX ADMIN — AMPICILLIN SODIUM AND SULBACTAM SODIUM 3 G: 100; 50 INJECTION, POWDER, FOR SOLUTION INTRAVENOUS at 17:00

## 2025-01-23 RX ADMIN — WITCH HAZEL 1 PAD: 500 SOLUTION RECTAL; TOPICAL at 17:35

## 2025-01-23 RX ADMIN — ONDANSETRON 4 MG: 2 INJECTION INTRAMUSCULAR; INTRAVENOUS at 07:31

## 2025-01-23 RX ADMIN — AMPICILLIN SODIUM AND SULBACTAM SODIUM 3 G: 100; 50 INJECTION, POWDER, FOR SOLUTION INTRAVENOUS at 09:01

## 2025-01-23 RX ADMIN — SODIUM CHLORIDE, SODIUM LACTATE, POTASSIUM CHLORIDE, AND CALCIUM CHLORIDE 125 ML/HR: .6; .31; .03; .02 INJECTION, SOLUTION INTRAVENOUS at 04:50

## 2025-01-23 RX ADMIN — OXYTOCIN 2 MILLI-UNITS/MIN: 10 INJECTION INTRAVENOUS at 01:12

## 2025-01-23 RX ADMIN — SODIUM CHLORIDE, SODIUM LACTATE, POTASSIUM CHLORIDE, AND CALCIUM CHLORIDE 125 ML/HR: .6; .31; .03; .02 INJECTION, SOLUTION INTRAVENOUS at 09:03

## 2025-01-23 RX ADMIN — SODIUM CHLORIDE, SODIUM LACTATE, POTASSIUM CHLORIDE, AND CALCIUM CHLORIDE 925 ML/HR: .6; .31; .03; .02 INJECTION, SOLUTION INTRAVENOUS at 03:30

## 2025-01-23 RX ADMIN — SODIUM CHLORIDE, SODIUM LACTATE, POTASSIUM CHLORIDE, AND CALCIUM CHLORIDE 125 ML/HR: .6; .31; .03; .02 INJECTION, SOLUTION INTRAVENOUS at 00:45

## 2025-01-23 RX ADMIN — ONDANSETRON 4 MG: 2 INJECTION INTRAMUSCULAR; INTRAVENOUS at 01:22

## 2025-01-23 RX ADMIN — BUTORPHANOL TARTRATE 1 MG: 1 INJECTION, SOLUTION INTRAMUSCULAR; INTRAVENOUS at 02:20

## 2025-01-23 RX ADMIN — IBUPROFEN 600 MG: 100 SUSPENSION ORAL at 22:05

## 2025-01-23 RX ADMIN — ACETAMINOPHEN 650 MG: 650 SUSPENSION ORAL at 17:36

## 2025-01-23 RX ADMIN — METOCLOPRAMIDE 10 MG: 5 INJECTION, SOLUTION INTRAMUSCULAR; INTRAVENOUS at 11:34

## 2025-01-23 RX ADMIN — ROPIVACAINE HYDROCHLORIDE: 2 INJECTION, SOLUTION EPIDURAL; INFILTRATION at 10:34

## 2025-01-23 RX ADMIN — ACETAMINOPHEN 650 MG: 650 SUSPENSION ORAL at 07:48

## 2025-01-23 RX ADMIN — BENZOCAINE AND LEVOMENTHOL 1 APPLICATION: 200; 5 SPRAY TOPICAL at 17:35

## 2025-01-23 RX ADMIN — SODIUM CHLORIDE, SODIUM LACTATE, POTASSIUM CHLORIDE, AND CALCIUM CHLORIDE 125 ML/HR: .6; .31; .03; .02 INJECTION, SOLUTION INTRAVENOUS at 05:54

## 2025-01-23 RX ADMIN — ROPIVACAINE HYDROCHLORIDE: 2 INJECTION, SOLUTION EPIDURAL; INFILTRATION at 03:25

## 2025-01-23 NOTE — OB LABOR/OXYTOCIN SAFETY PROGRESS
Oxytocin Safety Progress Check Note - Mikala Cunha 29 y.o. female MRN: [id number]    Unit/Bed#: -01 Encounter: [id number]    Dose (bonnie-units/min) Oxytocin: 2 bonnie-units/min (Per Dr. Watt)  Contraction Frequency (minutes): 1-3  Contraction Intensity: Moderate/Strong  Uterine Activity Characteristics: Regular  Cervical Dilation: 7        Cervical Effacement: 90  Fetal Station: 0  Baseline Rate (FHR): 155 bpm  Fetal Heart Rate (FHT): 162 BPM  FHR Category: II     Provider Notified: Yes  Provider Name: rep      Vital Signs:   Vitals:    01/23/25 0730   BP:    Pulse:    Resp:    Temp: 98.7 °F (37.1 °C)   SpO2:        Notes/comments:   Maternal temperature 102.2F; fetal tachycardia noted with baseline 180, moderate variability, and accelerations. Will start Unasyn for suspected chorioamnionitis. Tylenol given for symptomatic management. D/w Dr. Ospina.     Almaz Muhammad MD 1/23/2025 7:49 AM

## 2025-01-23 NOTE — PLAN OF CARE
Problem: Knowledge Deficit  Goal: Verbalizes understanding of labor plan  Description: Assess patient/family/caregiver's baseline knowledge level and ability to understand information.  Provide education via patient/family/caregiver's preferred learning method at appropriate level of understanding.     1. Provide teaching at level of understanding.  2. Provide teaching via preferred learning method(s).  Outcome: Completed  Goal: Patient/family/caregiver demonstrates understanding of disease process, treatment plan, medications, and discharge instructions  Description: Complete learning assessment and assess knowledge base.  Interventions:  - Provide teaching at level of understanding  - Provide teaching via preferred learning methods  Outcome: Completed     Problem: Labor & Delivery  Goal: Manages discomfort  Description: Assess and monitor for signs and symptoms of discomfort.  Assess patient's pain level regularly and per hospital policy.  Administer medications as ordered. Support use of nonpharmacological methods to help control pain such as distraction, imagery, relaxation, and application of heat and cold.  Collaborate with interdisciplinary team and patient to determine appropriate pain management plan.    1. Include patient in decisions related to comfort.  2. Offer non-pharmacological pain management interventions.  3. Report ineffective pain management to physician.  Outcome: Completed  Goal: Patient vital signs are stable  Description: 1. Assess vital signs - vaginal delivery.  Outcome: Completed     Problem: BIRTH - VAGINAL/ SECTION  Goal: Fetal and maternal status remain reassuring during the birth process  Description: INTERVENTIONS:  - Monitor vital signs  - Monitor fetal heart rate  - Monitor uterine activity  - Monitor labor progression (vaginal delivery)  - DVT prophylaxis  - Antibiotic prophylaxis  Outcome: Completed  Goal: Emotionally satisfying birthing experience for  mother/fetus  Description: Interventions:  - Assess, plan, implement and evaluate the nursing care given to the patient in labor  - Advocate the philosophy that each childbirth experience is a unique experience and support the family's chosen level of involvement and control during the labor process   - Actively participate in both the patient's and family's teaching of the birth process  - Consider cultural, Islam and age-specific factors and plan care for the patient in labor  Outcome: Completed

## 2025-01-23 NOTE — ANESTHESIA PREPROCEDURE EVALUATION
Procedure:  LABOR ANALGESIA    Relevant Problems   GYN   (+) 38 weeks gestation of pregnancy        Physical Exam    Airway    Mallampati score: II         Dental   No notable dental hx     Cardiovascular  Cardiovascular exam normal    Pulmonary  Pulmonary exam normal     Other Findings  post-pubertal.      Anesthesia Plan  ASA Score- 2     Anesthesia Type- epidural with ASA Monitors.         Additional Monitors:     Airway Plan:            Plan Factors-Exercise tolerance (METS): >4 METS.    Chart reviewed.   Existing labs reviewed.                   Induction-     Postoperative Plan-     Perioperative Resuscitation Plan - Level 1 - Full Code.       Informed Consent- Anesthetic plan and risks discussed with patient.  I personally reviewed this patient with the CRNA. Discussed and agreed on the Anesthesia Plan with the CRNA..      NPO Status:  Vitals Value Taken Time   Date of last liquid 01/22/25 01/22/25 1610   Time of last liquid 1455 01/22/25 1610   Date of last solid 01/22/25 01/22/25 1610   Time of last solid 1455 01/22/25 1610

## 2025-01-23 NOTE — OB LABOR/OXYTOCIN SAFETY PROGRESS
Oxytocin Safety Progress Check Note - Mikala Cunha 29 y.o. female MRN: [id number]    Unit/Bed#: -01 Encounter: [id number]    Dose (bonnie-units/min) Oxytocin: 2 bonnie-units/min (Per Dr. Watt)  Contraction Frequency (minutes): 1.5-3  Contraction Intensity: Moderate/Strong  Uterine Activity Characteristics: Regular  Cervical Dilation: 7  Cervical Effacement: 90  Fetal Station: 0  Baseline Rate (FHR): 120 bpm  Fetal Heart Rate (FHT): 118 BPM  FHR Category: 1     Provider Notified: Yes  Provider Name: rep      Vital Signs:   Vitals:    01/23/25 0545   BP: 116/72   Pulse: 77   Resp: 18   Temp: 98.2 °F (36.8 °C)   SpO2:      Mikala reports feeling pressure. SVE unchanged dilation wise, though fetal head is lower at 0. Continue pitocin titration as tolerated.     Dr. Ramila dumont.    Joan Hearn MD 1/23/2025 6:37 AM

## 2025-01-23 NOTE — OB LABOR/OXYTOCIN SAFETY PROGRESS
Oxytocin Safety Progress Check Note - Mikala Cunha 29 y.o. female MRN: [id number]    Unit/Bed#: -01 Encounter: [id number]    Dose (bonnie-units/min) Oxytocin: 4 bonnie-units/min  Contraction Frequency (minutes): 2-3.5  Contraction Intensity: Mild/Moderate  Uterine Activity Characteristics: Regular  Cervical Dilation: 5-6  Cervical Effacement: 80  Fetal Station: -1  Baseline Rate (FHR): 130 bpm  Fetal Heart Rate (FHT): 130 BPM  FHR Category: 1    Vital Signs:   Vitals:    01/23/25 0145   BP: 106/68   Pulse: 85   Resp:    Temp:    SpO2:      Spontaneous rupture of membrane with clear fluid noted. SVE as above. FHT cat 1. Patient requesting epidural. Continue titration pitocin.    Dr. Mcgrath aware    Joan Hearn MD 1/23/2025 2:49 AM

## 2025-01-23 NOTE — PLAN OF CARE
Problem: Knowledge Deficit  Goal: Verbalizes understanding of labor plan  Description: Assess patient/family/caregiver's baseline knowledge level and ability to understand information.  Provide education via patient/family/caregiver's preferred learning method at appropriate level of understanding.     1. Provide teaching at level of understanding.  2. Provide teaching via preferred learning method(s).  Outcome: Progressing  Goal: Patient/family/caregiver demonstrates understanding of disease process, treatment plan, medications, and discharge instructions  Description: Complete learning assessment and assess knowledge base.  Interventions:  - Provide teaching at level of understanding  - Provide teaching via preferred learning methods  Outcome: Progressing     Problem: Labor & Delivery  Goal: Manages discomfort  Description: Assess and monitor for signs and symptoms of discomfort.  Assess patient's pain level regularly and per hospital policy.  Administer medications as ordered. Support use of nonpharmacological methods to help control pain such as distraction, imagery, relaxation, and application of heat and cold.  Collaborate with interdisciplinary team and patient to determine appropriate pain management plan.    1. Include patient in decisions related to comfort.  2. Offer non-pharmacological pain management interventions.  3. Report ineffective pain management to physician.  Outcome: Progressing  Goal: Patient vital signs are stable  Description: 1. Assess vital signs - vaginal delivery.  Outcome: Progressing     Problem: BIRTH - VAGINAL/ SECTION  Goal: Fetal and maternal status remain reassuring during the birth process  Description: INTERVENTIONS:  - Monitor vital signs  - Monitor fetal heart rate  - Monitor uterine activity  - Monitor labor progression (vaginal delivery)  - DVT prophylaxis  - Antibiotic prophylaxis  Outcome: Progressing  Goal: Emotionally satisfying birthing experience for  mother/fetus  Description: Interventions:  - Assess, plan, implement and evaluate the nursing care given to the patient in labor  - Advocate the philosophy that each childbirth experience is a unique experience and support the family's chosen level of involvement and control during the labor process   - Actively participate in both the patient's and family's teaching of the birth process  - Consider cultural, Oriental orthodox and age-specific factors and plan care for the patient in labor  Outcome: Progressing     Problem: PAIN - ADULT  Goal: Verbalizes/displays adequate comfort level or baseline comfort level  Description: Interventions:  - Encourage patient to monitor pain and request assistance  - Assess pain using appropriate pain scale  - Administer analgesics based on type and severity of pain and evaluate response  - Implement non-pharmacological measures as appropriate and evaluate response  - Consider cultural and social influences on pain and pain management  - Notify physician/advanced practitioner if interventions unsuccessful or patient reports new pain  Outcome: Progressing     Problem: INFECTION - ADULT  Goal: Absence or prevention of progression during hospitalization  Description: INTERVENTIONS:  - Assess and monitor for signs and symptoms of infection  - Monitor lab/diagnostic results  - Monitor all insertion sites, i.e. indwelling lines, tubes, and drains  - Monitor endotracheal if appropriate and nasal secretions for changes in amount and color  - [place] appropriate cooling/warming therapies per order  - Administer medications as ordered  - Instruct and encourage patient and family to use good hand hygiene technique  - Identify and instruct in appropriate isolation precautions for identified infection/condition  Outcome: Progressing  Goal: Absence of fever/infection during neutropenic period  Description: INTERVENTIONS:  - Monitor WBC    Outcome: Progressing     Problem: SAFETY ADULT  Goal: Patient  will remain free of falls  Description: INTERVENTIONS:  - Educate patient/family on patient safety including physical limitations  - Instruct patient to call for assistance with activity   - Consult OT/PT to assist with strengthening/mobility   - Keep Call bell within reach  - Keep bed low and locked with side rails adjusted as appropriate  - Keep care items and personal belongings within reach  - Initiate and maintain comfort rounds  - Make Fall Risk Sign visible to staff  - Offer Toileting   Outcome: Progressing  Goal: Maintain or return to baseline ADL function  Description: INTERVENTIONS:  -  Assess patient's ability to carry out ADLs; assess patient's baseline for ADL function and identify physical deficits which impact ability to perform ADLs (bathing, care of mouth/teeth, toileting, grooming, dressing, etc.)  - Assess/evaluate cause of self-care deficits   - Assess range of motion  - Assess patient's mobility; develop plan if impaired  - Assess patient's need for assistive devices and provide as appropriate  - Encourage maximum independence but intervene and supervise when necessary  - Involve family in performance of ADLs  - Assess for home care needs following discharge   - Consider OT consult to assist with ADL evaluation and planning for discharge  - Provide patient education as appropriate  Outcome: Progressing     Problem: DISCHARGE PLANNING  Goal: Discharge to home or other facility with appropriate resources  Description: INTERVENTIONS:  - Identify barriers to discharge w/patient and caregiver  - Arrange for needed discharge resources and transportation as appropriate  - Identify discharge learning needs (meds, wound care, etc.)  - Arrange for interpretive services to assist at discharge as needed  - Refer to Case Management Department for coordinating discharge planning if the patient needs post-hospital services based on physician/advanced practitioner order or complex needs related to functional  status, cognitive ability, or social support system  Outcome: Progressing

## 2025-01-23 NOTE — L&D DELIVERY NOTE
Vaginal Delivery Summary - OB/GYN   Mikala Cunha 29 y.o. female MRN: [id number]  Unit/Bed#: -01 Encounter: [id number]      Predelivery Diagnosis:  1. Pregnancy at 38 weeks gestation  2. Insulin controlled gestational diabetes  3.  Induction of labor     Postdelivery Diagnosis:  1. Same as above  2. Delivery of term     Procedure: Spontaneous Vaginal Delivery, repair of 2nd degree laceration    Attending: Dr. Ospina    Assistant: Dr. Muhammad    Anesthesia: Epidural    QBL: 68 cc  Admission H.2 g/dL  Admission platelets: 190k    Complications: none apparent    Specimens: cord blood, arterial and venous cord blood gasses, placenta to pathology    Findings:   1. Viable female at 1607, with APGARS of 8 and 9 at 1 and 5 minutes respectively,  2. Spontaneous delivery of intact placenta   3. 2nd degree laceration repaired with 2-0 Vicryl   4. Blood gases:    Umbilical Cord Venous Blood Gas:  Results from last 7 days   Lab Units 25  1613   PH COV  7.357   PCO2 COV mm HG 35.3   HCO3 COV mmol/L 19.4   BASE EXC COV mmol/L -5.3*   O2 CT CD VB mL/dL 14.9   O2 HGB, VENOUS CORD % 71.7     Umbilical Cord Arterial Blood Gas:  Results from last 7 days   Lab Units 25  1613   PH COA  7.300   PCO2 COA  44.2   PO2 COA mm HG 19.9   HCO3 COA mmol/L 21.3   BASE EXC COA mmol/L -5.1*   O2 CONTENT CORD ART ml/dl 8.0   O2 HGB, ARTERIAL CORD % 40.8       Disposition:  Patient tolerated the procedure well and was recovering in labor and delivery room     Brief history and labor course:  Ms. Mikala Cunha is a 29 y.o.  at 38wk0d. She presented to labor and delivery for induction of labor for insulin controlled gestational diabetes. Her induction was started with a orozco balloon and cytotec. Pitocin was started. She spontaneously ruptured for clear fluid and received an epidural for pain control. She progressed to complete dilation and began pushing.     Description of procedure    After pushing for 1 hour and 37  minutes, at 1607 patient delivered a viable female , wt pending, apgars of 8 (1 min) and 9 (5 min). The fetal vertex delivered spontaneously. Baby restituted to KIESHA. Baby was checked for nuchal. There was no nuchal. The anterior right shoulder delivered atraumatically with maternal expulsive forces and the assistance of downward traction. The posterior shoulder delivered with maternal expulsive forces and the assistance of upward traction. The remainder of the fetus delivered spontaneously.     Upon delivery, the infant was placed on the mothers abdomen and the cord was clamped and cut. Delayed cord clamping was performed.  The infant was noted to cry spontaneously and was moving all extremities appropriately. There was no evidence for injury. Awaiting nurse resuscitators evaluated the . Arterial and venous cord blood gases and cord blood was collected for analysis. These were promptly sent to the lab. In the immediate post-partum, 30 units of IV pitocin was administered, and the uterus was noted to contract down well with massage and pitocin. The placenta delivered spontaneously at 1610 and was noted to have a centrally inserted 3 vessel cord.     The vagina, cervix, perineum, and rectum were inspected and there was noted to be a second degree laceration with 2-0 Vicryl. Under adequate anesthesia, the apex of the vaginal laceration was identified and an anchoring suture was placed 1 cm above the apex. The vaginal mucosa and underlying rectovaginal fascia were closed using a running locked  suture to the hymenal ring. The suture was then brought underneath the hymenal ring. A stitch was then placed through the bulbocavernosus muscle. Continuing with the same suture, the transverse perineal muscles were reapproximated. The suture was brought to the posterior apex of the skin laceration and then the skin was reapproximated in a subcuticular fashion to the hymenal ring. Excellent hemostasis was achieved.        At the conclusion of the procedure, all needle, sponge, and instrument counts were noted to be correct. Patient tolerated the procedure well and was allowed to recover in labor and delivery room with family and  before being transferred to the post-partum floor. Dr. Ospina was present and participated in all key portions of the case.        Almaz Muhammad MD  2025  4:32 PM

## 2025-01-23 NOTE — ASSESSMENT & PLAN NOTE
Routine postpartum care  Encourage ambulation  Encourage familial bonding  Lactation support as needed  Pain: Motrin/Tylenol around the clock

## 2025-01-23 NOTE — ANESTHESIA PROCEDURE NOTES
Epidural Block    Patient location during procedure: OB/L&D  Start time: 1/23/2025 3:19 AM  Reason for block: procedure for pain  Staffing  Performed by: Radha Zavaleta CRNA  Authorized by: JOSE Monk MD    Preanesthetic Checklist  Completed: patient identified, IV checked, site marked, risks and benefits discussed, surgical consent, monitors and equipment checked, pre-op evaluation and timeout performed  Epidural  Patient position: sitting  Prep: ChloraPrep  Sedation Level: no sedation  Patient monitoring: frequent blood pressure checks, continuous pulse oximetry and heart rate  Approach: midline  Location: lumbar, L3-4  Injection technique: DESIRAE saline  Needle  Needle type: Tuohy   Needle gauge: 18 G  Needle insertion depth: 7 cm  Catheter type: multi-orifice  Catheter size: 20 G  Catheter at skin depth: 13 cm  Catheter securement method: stabilization device and clear occlusive dressing  Test dose: negativelidocaine-epinephrine (XYLOCAINE-MPF/EPINEPHRINE) 1.5 %-1:200,000 injection 3 mL - Epidural   3 mL - 1/23/2025 3:21:00 AM  Assessment  Sensory level: T10  Number of attempts: 2negative aspiration for CSF, negative aspiration for heme and no paresthesia on injection  patient tolerated the procedure well with no immediate complications  Additional Notes  Attempt at L2-3.  Unsuccessful  Hitting bone.  2nd attempt at L3-4 placed with ease.

## 2025-01-23 NOTE — DISCHARGE INSTRUCTIONS
Self Care After Delivery   AMBULATORY CARE:   The postpartum period is the period of time from delivery to about 6 weeks. During this time you may experience many physical and emotional changes. It is important to understand what is normal and when you need to call your healthcare provider. It is also important to know how to care for yourself during this time.  Call your local emergency number (911 in the [place]) for any of the following:   You see or hear things that are not there, or have thoughts of harming yourself or your baby.     You soak through 1 pad in 15 minutes, have blurry vision, clammy or pale skin, and feel faint.     You faint or lose consciousness.     You have trouble breathing.     You cough up blood.     Your  incision comes apart.     Seek care immediately if:   Your heart is beating faster than usual.     You have a bad headache or changes in your vision.     Your perineal tear, episiotomy site, or  incision is red, swollen, bleeding, or draining pus.     You have severe abdominal pain.     Call your doctor or obstetrician if:   Your leg is painful, red, and larger than usual.     You soak through 1 or more pads in an hour, or pass blood clots larger than a quarter from your vagina.     You have a fever.     You have new or worsening pain in your abdomen or vagina.     You continue to have depression 1 to 2 weeks after you deliver.     You have trouble sleeping.     You have foul-smelling discharge from your vagina.     You have pain or burning when you urinate.     You do not have a bowel movement for 3 days or more.     You have nausea or are vomiting.     You have hard lumps or red streaks over your breasts.     You have cracked nipples or bleed from your nipples.     You have questions or concerns about your condition or care.     Physical changes: The following are normal changes after you give birth:  Pain in the area between your anus and vagina     Breast pain      Constipation or hemorrhoids     Hot or cold flashes     Vaginal bleeding or discharge     Mild to moderate abdominal cramping     Difficulty controlling bowel movements or urine     Emotional changes: A drop in hormone levels after you deliver may cause changes in your emotions. You may feel irritable, sad, or anxious. You may cry easily or for no reason. You may also feel depressed. Depression that continues can be a sign of postpartum depression, a condition that can be treated. Treatment may include talk therapy, medicines, or both. Healthcare providers will ask how you are feeling and if you have any depression. These talks can happen during appointments for your medical care and for your baby's care, such as well child visits. Providers can help you find ways to care for yourself and your baby. Talk to your providers about the following:  When emotional changes or depression started, and if it is getting worse over time     Problems you are having with daily activities, sleep, or caring for your baby     If anything makes you feel worse, or makes you feel better     Feeling that you are not bonding with your baby the way you want     Any problems your baby has with sleeping or feeding     Your baby is fussy or cries a lot     Support you have from friends, family, or others     Breast care for breastfeeding mothers: You may have sore breasts for 3 to 6 days after you give birth. This happens as your milk begins to fill your breasts. You may also have sore breasts if you do not breastfeed frequently. Do the following to care for your breasts:  Apply a moist, warm, compress to your breast as directed. This may help soothe your breasts. Make sure the washcloth is not too hot before you apply it to your breast.     Nurse your baby or pump your milk frequently. This may prevent clogged milk ducts. Ask your healthcare provider how often to nurse or pump.     Massage your breasts as directed. This may help increase  your milk flow. Gently rub your breasts in a circular motion before you breastfeed. You may need to gently squeeze your breast or nipple to help release milk. You can also use a breast pump to help release milk from your breast.     Wash your breasts with warm water only. Do not put soap on your nipples. Soap may cause your nipples to become dry.     Apply lanolin cream to your nipples as directed. Lanolin cream may add moisture to your skin and prevent nipple dryness. Always wash off lanolin cream with warm water before you breastfeed.     Place pads in your bra. Your nipples may leak milk when you are not breastfeeding. You can place pads inside of your bra to help prevent leaking onto your clothing. Ask your healthcare provider where to purchase bra pads.     Get breastfeeding support if needed. Healthcare providers can answer questions about breastfeeding and provide you with support. Ask your healthcare provider who you can contact if you need breastfeeding support.     Breast care for non-breastfeeding mothers: Milk will fill your breasts even if you bottle feed your baby. Do the following to help stop your milk from filling your breasts and causing pain:  Wear a bra with support at all times. A sports bra or a tight-fitting bra will help stop your milk from coming in.     Apply ice on each breast for 15 to 20 minutes every hour or as directed. Use an ice pack, or put crushed ice in a plastic bag. Cover it with a towel before you apply it to your breast. Ice helps your milk ducts shrink.     Keep your breasts away from warm water. Warm water will make it easier for milk to fill your breasts. Stand with your breasts away from warm water in the shower.     Limit how much you touch your breasts. This will prevent them from filling with milk.     Perineum care: Your perineum is the area between your rectum and vagina. It is normal to have swelling and pain in this area after you give birth. If you had an  episiotomy, your healthcare provider may give you special instructions.  Clean your perineum after you use the bathroom. This may prevent infection and help with healing. Use a spray bottle with warm water to clean your perineum. You may also gently spray warm water against your perineum when you urinate. Always wipe front to back.     Take a sitz bath as directed. A sitz bath may help relieve swelling and pain. Fill your bath tub or bucket with water up to your hips and sit in the water. Use cold water for 2 days after you deliver. Then use warm water. Ask your healthcare provider for more information about a sitz bath.     Apply ice packs for the first 24 hours or as directed. Use a plastic glove filled with ice or buy an ice pack. Wrap the ice pack or plastic glove in a small towel or wash cloth. Place the ice pack on your perineum for 20 minutes at a time.     Sit on a donut-shaped pillow. This may relieve pressure on your perineum when you sit.     Use wipes that contain medicine or take pills as directed. Your healthcare provider may tell you to use witch hazel pads. You can place witch hazel pads in the refrigerator before you apply them to your perineum. Your provider may also tell you to take NSAIDs. Ask him or her how often to take pills or use the wipes.     Do not go swimming or take tub baths for 4 to 6 weeks or as directed. This will help prevent an infection in your vagina or uterus.     Bowel and bladder care: It may take 3 to 5 days to have a bowel movement after you deliver your baby. You can do the following to prevent or manage constipation, and get control of your bowel or bladder:  Take stool softeners as directed. A stool softener is medicine that will make your bowel movements softer. This may prevent or relieve constipation. A stool softener may also make bowel movements less painful.     Drink plenty of liquids. Ask how much liquid to drink each day and which liquids are best for you.  Liquids may help prevent constipation.     Eat foods high in fiber. Examples include fruits, vegetables, grains, beans, and lentils. Ask your healthcare provider how much fiber you need each day. Fiber may prevent constipation.          Do Kegel exercises as directed. Kegel exercises will help strengthen the muscles that control bowel movements and urination. Ask your healthcare provider for more information on Kegel exercises.     Apply cold compresses or medicine to hemorrhoids as directed. This may relieve swelling and pain. Your healthcare provider may tell you to apply ice or wipes that contain medicine to your hemorrhoids. He or she may also tell you to use a sitz bath. Ask your provider for more information on how to manage hemorrhoids.     Nutrition: Good nutrition is important in the postpartum period. It will help you return to a healthy weight, increase your energy levels, and prevent constipation. It will also help you get enough nutrients and calories if you are going to breastfeed your baby.  Eat a variety of healthy foods. Healthy foods include fruits, vegetables, whole-grain breads, low-fat dairy products, beans, lean meats, and fish. You may need 500 to 700 extra calories each day if you breastfeed your baby. You may also need extra protein.          Limit foods with added sugar and high amounts of fat. These foods are high in calories and low in healthy nutrients. Read food labels so you know how much sugar and fat is in the food you want to eat.     Drink 8 to 10 glasses of water per day. Water will help you make plenty of milk for your baby. It will also help prevent constipation. Drink a glass of water every time you breastfeed your baby.     Take vitamins as directed. Ask your healthcare provider what vitamins you need.     Limit caffeine and alcohol if you are breastfeeding. Caffeine and alcohol can get into your breast milk. Caffeine and alcohol can make your baby fussy. They can also  interfere with your baby's sleep. Ask your healthcare provider if you can drink alcohol or caffeine.     Rest and sleep: You may feel very tired in the postpartum period. Enough sleep will help you heal and give you energy to care for your baby. The following may help you get sleep and rest:  Nap when your baby naps. Your baby may nap several times during the day. Get rest during this time.     Limit visitors. Many people may want to see you and your baby. Ask friends or family to visit on different days. This will give you time to rest.     Do not plan too much for one day. Put off household chores so that you have time to rest. Gradually do more each day.     Ask for help from family, friends, or neighbors. Ask them to help you with laundry, cleaning, or errands. Also ask someone to watch the baby while you take a nap or relax. Ask your partner to help with the care of your baby. Pump some of your breast milk so your partner can feed your baby during the night.     Exercise after delivery: Wait until your healthcare provider says it is okay to exercise. Exercise can help you lose weight, increase your energy levels, and manage your mood. It can also prevent constipation and blood clots. Start with gentle exercises such as walking. Do more as you have more energy. You may need to avoid abdominal exercises for 1 to 2 weeks after you deliver. Talk to your healthcare provider about an exercise plan that is right for you.     Sexual activity after delivery:   Do not have sex until your healthcare provider says it is okay. You may need to wait 4 to 6 weeks before you have sex. This may prevent infection and allow time to heal.     Your menstrual cycle may begin as soon as 3 weeks after you deliver. Your period may be delayed if you breastfeed your baby. You can become pregnant before you get your first postpartum period. Talk to your healthcare provider about birth control that is right for you. Some types of birth  control are not safe during breastfeeding.     For support and more information: Join a support group for new mothers. Ask for help from family and friends with chores, errands, and care of your baby.  Office of Women's Health,  Department of Health and Human Services  [address] [place] [address]  [address] [place] [address]  Phone: [phone]  Web Address: www.womenshealth.gov  [facility]  [address] [place] [address]  Web Address: [email or web address]  Follow up with your doctor or obstetrician as directed: You will need to follow up within 2 to 6 weeks of delivery. Write down your questions so you remember to ask them at your visits.  © Copyright [employer] 2020 Information is for End User's use only and may not be sold, redistributed or otherwise used for commercial purposes. All illustrations and images included in CareNotes® are the copyrighted property of [employer] or [employer]  The above information is an  only. It is not intended as medical advice for individual conditions or treatments. Talk to your doctor, nurse or pharmacist before following any medical regimen to see if it is safe and effective for you.

## 2025-01-23 NOTE — ANESTHESIA POSTPROCEDURE EVALUATION
Post-Op Assessment Note    CV Status:  Stable  Pain Score: 0    Pain management: adequate      Post-op block assessment: site cleaned, catheter intact and no complications   Mental Status:  Alert and awake   Hydration Status:  Stable and euvolemic   PONV Controlled:  None   Airway Patency:  Patent     Post Op Vitals Reviewed: Yes    No anethesia notable event occurred.    Staff: CRNA           Last Filed PACU Vitals:  Vitals Value Taken Time   Temp     Pulse 108 01/23/25 1801   /92 01/23/25 1801   Resp     SpO2     Vitals shown include unfiled device data.

## 2025-01-23 NOTE — OB LABOR/OXYTOCIN SAFETY PROGRESS
Oxytocin Safety Progress Check Note - Mikala Cunha 29 y.o. female MRN: [id number]    Unit/Bed#: -01 Encounter: [id number]    Dose (bonnie-units/min) Oxytocin: 2 bonnie-units/min (Per Dr. Watt)  Contraction Frequency (minutes): 2-3  Contraction Intensity: Moderate  Uterine Activity Characteristics: Regular  Cervical Dilation: 7  Cervical Effacement: 90  Fetal Station: -1  Baseline Rate (FHR): 125 bpm  Fetal Heart Rate (FHT): 131 BPM  FHR Category: 1    Vital Signs:   Vitals:    01/23/25 0405   BP: 129/78   Pulse: 97   Resp:    Temp:    SpO2:        Patient noted to have a 4 minute late deceleration starting at 0434 with reyes into the 80s. Patient repositioned, fluid bolus started. She was noted to have contractions q1min, so pitocin was decreased. FHT returned to baseline.     Dr. Mcgarth aware    Joan Hearn MD 1/23/2025 4:43 AM

## 2025-01-23 NOTE — DISCHARGE SUMMARY
Discharge Summary - OB/GYN   Mikala Cunha 29 y.o. female MRN: [id number]  Unit/Bed#:   Encounter: [id number]      Admission Date: 2025     Discharge Date: 2025    Admitting Diagnosis:   Patient Active Problem List   Diagnosis    Uterine fibroid in pregnancy    Excessive fetal growth affecting management of mother in third trimester, antepartum    GDM, class A2    Abdominal pain affecting pregnancy    Prenatal care in third trimester    Fetal heart rate decelerations affecting management of mother    Hyperglycemia during pregnancy    38 weeks gestation of pregnancy     (spontaneous vaginal delivery)        Discharge Diagnosis:   Same, delivered    Procedures: spontaneous vaginal delivery    Delivery Attending: Padma Ospina MD  Discharge Attending: Dr. Fregoso    [facility] Course:   Ms. Mikala Cunha is a 29 y.o.  at 38wk0d. She presented to labor and delivery for induction of labor for insulin controlled gestational diabetes. Her induction was started with a orozco balloon and cytotec. Pitocin was started. She spontaneously ruptured for clear fluid and received an epidural for pain control. She progressed to complete dilation and began pushing.     She delivered a viable female  on 2025 at 1607. Weight 7lbs 9.3oz via spontaneous vaginal delivery. Apgars were 8 (1 min) and 9 (5 min).  was transferred to  nursery. Patient tolerated the procedure well and was transferred to recovery in stable condition.     Her postpartum course was complicated lower uterine segment atony requiring bimanual exam and IM Methergine. Her postpartum pain was well controlled with oral analgesics.    On day of discharge, she was ambulating and able to reasonably perform all ADLs. She was voiding and had appropriate bowel function. Pain was well controlled. She was discharged home on post-partum day #1 without complications. Patient was instructed to follow up with her OB as an  outpatient and was given appropriate warnings to call provider if she develops signs of infection or uncontrolled pain.    Complications: none apparent    Condition at discharge: good     Discharge instructions/Information to patient and family:   - Do not place anything (no partner, tampons or douche) in your vagina for 6 weeks.  -You may walk for exercise for the first 6 weeks then gradually return to your usual activities.   -Please do not drive for 1 week if you have no stitches and for 2 weeks if you have stitches or underwent a  delivery.    -You may take baths or shower per your preference.   -Please look at your bust (breasts) in the mirror daily and call for redness or tenderness or increased warmth.   -Please call us for temperature > 100.4*F or 38* C, worsening pain or a foul discharge.      Discharge Medications:   Prenatal vitamin daily for 6 months or the duration of nursing whichever is longer.  Motrin 600 mg orally every 6 hours as needed for pain  Tylenol (over the counter) per bottle directions as needed for pain: do NOT use with percocet  Hydrocortisone cream 1% (over the counter) applied 1-2x daily to hemorrhoids as needed    Planned Readmission: No    Joan Hearn  PGY-1 OB/GYN  25  4:52 PM

## 2025-01-23 NOTE — OB LABOR/OXYTOCIN SAFETY PROGRESS
Oxytocin Safety Progress Check Note - Mikala Cunha 29 y.o. female MRN: [id number]    Unit/Bed#: LD  Encounter: [id number]    Dose (bonnie-units/min) Oxytocin: 12 bonnie-units/min  Contraction Frequency (minutes): 2-3  Contraction Intensity: Moderate/Strong  Uterine Activity Characteristics: Irregular  Cervical Dilation: 10  Dilation Complete Date: 25  Dilation Complete Time: 1337  Cervical Effacement: 100  Fetal Station: 0  Baseline Rate (FHR): 135 bpm  Fetal Heart Rate (FHT): 134 BPM  FHR Category: I     Provider Notified: Yes  Provider Name: rep      Vital Signs:   Vitals:    25 1301   BP: 128/87   Pulse: 93   Resp:    Temp:    SpO2:        Notes/comments:   SVE as above, 10/100/0. FHT Cat I. Anticipate . Plan to begin pushing soon. Dr. Bhavesh dumont.     Almaz Muhammad MD 2025 1:37 PM

## 2025-01-23 NOTE — OB LABOR/OXYTOCIN SAFETY PROGRESS
Labor Progress Note - Mikala Cunha 29 y.o. female MRN: [id number]    Unit/Bed#: -01 Encounter: [id number]       Contraction Frequency (minutes): 3-5  Contraction Intensity: Mild  Uterine Activity Characteristics: Irregular (irritability between ctx's at times)  Cervical Dilation: 4    Cervical Effacement: 70  Fetal Station: -2  Baseline Rate (FHR): 120 bpm  Fetal Heart Rate (FHT): 112 BPM  FHR Category: 1    Vital Signs:   Vitals:    01/22/25 2118   BP: 126/81   Pulse: 77   Resp:    Temp:    SpO2:      Carroll balloon dislodged. SVE as above. FHT cat 1. Plan to start pitocin.    Dr. Mcgrath aware    Joan Hearn MD 1/23/2025 12:43 AM

## 2025-01-23 NOTE — OB LABOR/OXYTOCIN SAFETY PROGRESS
Oxytocin Safety Progress Check Note - Mikala Cunha 29 y.o. female MRN: [id number]    Unit/Bed#: -01 Encounter: [id number]    Dose (bonnie-units/min) Oxytocin: 10 bonnie-units/min  Contraction Frequency (minutes): 2-4  Contraction Intensity: Moderate  Uterine Activity Characteristics: Irregular  Cervical Dilation: 9        Cervical Effacement: 100  Fetal Station: 0  Baseline Rate (FHR): 155 bpm  Fetal Heart Rate (FHT): 162 BPM  FHR Category: I     Provider Notified: Yes  Provider Name: rep      Vital Signs:   Vitals:    01/23/25 1100   BP: 105/61   Pulse:    Resp:    Temp:    SpO2:        Notes/comments:   Mikala feeling intermittent rectal pressure with contractions. SVE as above, 9/100/0. Continue pitocin titration. FHT Cat I. Dr. Ospina aware.     Almaz Muhammad MD 1/23/2025 11:19 AM

## 2025-01-23 NOTE — OB LABOR/OXYTOCIN SAFETY PROGRESS
Oxytocin Safety Progress Check Note - Mikala Cunha 29 y.o. female MRN: [id number]    Unit/Bed#: -01 Encounter: [id number]    Dose (bonnie-units/min) Oxytocin: 2 bonnie-units/min (Per Dr. Watt)  Contraction Frequency (minutes): 1-3  Contraction Intensity: Moderate/Strong  Uterine Activity Characteristics: Regular  Cervical Dilation: 7        Cervical Effacement: 90  Fetal Station: 0  Baseline Rate (FHR): 155 bpm  Fetal Heart Rate (FHT): 162 BPM  FHR Category: II, fetal tachycardia     Provider Notified: Yes  Provider Name: rep      Vital Signs:   Vitals:    01/23/25 0830   BP: 119/74   Pulse: 102   Resp:    Temp:    SpO2:        Notes/comments:   SVE as above, unchanged from prior. FHT Cat II for fetal tachycardia, Unasyn and tylenol ordered.Maternal T 101.1F. Continue to monitor. Dr. Bhavesh dumont.     Almaz Muhammad MD 1/23/2025 9:04 AM

## 2025-01-24 VITALS
HEIGHT: 62 IN | DIASTOLIC BLOOD PRESSURE: 65 MMHG | WEIGHT: 172 LBS | SYSTOLIC BLOOD PRESSURE: 102 MMHG | BODY MASS INDEX: 31.65 KG/M2 | TEMPERATURE: 97.8 F | HEART RATE: 85 BPM | RESPIRATION RATE: 18 BRPM | OXYGEN SATURATION: 100 %

## 2025-01-24 LAB
BASOPHILS # BLD AUTO: 0.04 THOUSANDS/ΜL (ref 0–0.1)
BASOPHILS NFR BLD AUTO: 0 % (ref 0–1)
EOSINOPHIL # BLD AUTO: 0.11 THOUSAND/ΜL (ref 0–0.61)
EOSINOPHIL NFR BLD AUTO: 1 % (ref 0–6)
ERYTHROCYTE [DISTWIDTH] IN BLOOD BY AUTOMATED COUNT: 13.8 % (ref 11.6–15.1)
HCT VFR BLD AUTO: 27.2 % (ref 34.8–46.1)
HGB BLD-MCNC: 8.8 G/DL (ref 11.5–15.4)
IMM GRANULOCYTES # BLD AUTO: 0.25 THOUSAND/UL (ref 0–0.2)
IMM GRANULOCYTES NFR BLD AUTO: 1 % (ref 0–2)
LYMPHOCYTES # BLD AUTO: 1.41 THOUSANDS/ΜL (ref 0.6–4.47)
LYMPHOCYTES NFR BLD AUTO: 7 % (ref 14–44)
MCH RBC QN AUTO: 28 PG (ref 26.8–34.3)
MCHC RBC AUTO-ENTMCNC: 32.4 G/DL (ref 31.4–37.4)
MCV RBC AUTO: 87 FL (ref 82–98)
MONOCYTES # BLD AUTO: 1.69 THOUSAND/ΜL (ref 0.17–1.22)
MONOCYTES NFR BLD AUTO: 9 % (ref 4–12)
NEUTROPHILS # BLD AUTO: 15.52 THOUSANDS/ΜL (ref 1.85–7.62)
NEUTS SEG NFR BLD AUTO: 82 % (ref 43–75)
NRBC BLD AUTO-RTO: 0 /100 WBCS
PLATELET # BLD AUTO: 174 THOUSANDS/UL (ref 149–390)
PMV BLD AUTO: 10.2 FL (ref 8.9–12.7)
RBC # BLD AUTO: 3.14 MILLION/UL (ref 3.81–5.12)
WBC # BLD AUTO: 19.02 THOUSAND/UL (ref 4.31–10.16)

## 2025-01-24 PROCEDURE — 99024 POSTOP FOLLOW-UP VISIT: CPT | Performed by: OBSTETRICS & GYNECOLOGY

## 2025-01-24 PROCEDURE — NC001 PR NO CHARGE: Performed by: OBSTETRICS & GYNECOLOGY

## 2025-01-24 PROCEDURE — 85025 COMPLETE CBC W/AUTO DIFF WBC: CPT

## 2025-01-24 RX ORDER — ACETAMINOPHEN 160 MG/5ML
963 SUSPENSION ORAL EVERY 6 HOURS PRN
Qty: 473 ML | Refills: 0 | Status: SHIPPED | OUTPATIENT
Start: 2025-01-24

## 2025-01-24 RX ORDER — KETOROLAC TROMETHAMINE 30 MG/ML
30 INJECTION, SOLUTION INTRAMUSCULAR; INTRAVENOUS ONCE
Status: COMPLETED | OUTPATIENT
Start: 2025-01-24 | End: 2025-01-24

## 2025-01-24 RX ORDER — IBUPROFEN 100 MG/5ML
600 SUSPENSION ORAL EVERY 6 HOURS SCHEDULED
Qty: 3600 ML | Refills: 0 | Status: SHIPPED | OUTPATIENT
Start: 2025-01-24

## 2025-01-24 RX ORDER — OXYTOCIN/RINGER'S LACTATE 30/500 ML
250 PLASTIC BAG, INJECTION (ML) INTRAVENOUS ONCE
Status: DISCONTINUED | OUTPATIENT
Start: 2025-01-24 | End: 2025-01-24

## 2025-01-24 RX ORDER — HYDROMORPHONE HCL/PF 1 MG/ML
0.5 SYRINGE (ML) INJECTION ONCE AS NEEDED
Status: DISCONTINUED | OUTPATIENT
Start: 2025-01-24 | End: 2025-01-24 | Stop reason: HOSPADM

## 2025-01-24 RX ORDER — METHYLERGONOVINE MALEATE 0.2 MG/ML
0.2 INJECTION INTRAVENOUS ONCE
Status: COMPLETED | OUTPATIENT
Start: 2025-01-24 | End: 2025-01-24

## 2025-01-24 RX ORDER — MORPHINE SULFATE 10 MG/ML
5 INJECTION, SOLUTION INTRAMUSCULAR; INTRAVENOUS ONCE
Status: COMPLETED | OUTPATIENT
Start: 2025-01-24 | End: 2025-01-24

## 2025-01-24 RX ADMIN — IBUPROFEN 600 MG: 100 SUSPENSION ORAL at 15:10

## 2025-01-24 RX ADMIN — MORPHINE SULFATE 5 MG: 10 INJECTION INTRAVENOUS at 00:31

## 2025-01-24 RX ADMIN — IBUPROFEN 600 MG: 100 SUSPENSION ORAL at 09:04

## 2025-01-24 RX ADMIN — ONDANSETRON 4 MG: 2 INJECTION INTRAMUSCULAR; INTRAVENOUS at 09:14

## 2025-01-24 RX ADMIN — ACETAMINOPHEN 650 MG: 650 SUSPENSION ORAL at 18:28

## 2025-01-24 RX ADMIN — ACETAMINOPHEN 650 MG: 650 SUSPENSION ORAL at 01:56

## 2025-01-24 RX ADMIN — KETOROLAC TROMETHAMINE 30 MG: 30 INJECTION, SOLUTION INTRAMUSCULAR; INTRAVENOUS at 00:31

## 2025-01-24 RX ADMIN — METHYLERGONOVINE MALEATE 0.2 MG: 0.2 INJECTION INTRAVENOUS at 01:04

## 2025-01-24 NOTE — QUICK NOTE
Called to bedside by nursing to evaluate patient after she was noted to pass a large baseball sized clot followed by a plum sized clot, totaling 345cc.  Abdominal exam notable for firm fundus at level of umbilicus. Transabdominal ultrasound demonstrated a thin endometrial stripe with no evidence of blood clots or retained products. Patient was counseled on the recommendation of a bimanual exam to evacuate additional blood clots and evaluate lower uterine segment for decreased tone. She was given 30 mg of IM Toradol and 5 mg of IM morphine for pain management. A bimanual exam was performed and several blood clots were removed from lower uterine segment, which was noted to be boggy.  IM Methergine was given.  Post exam CBC showed hemoglobin of 8.8.  IV access replaced for potential Venofer infusion in the a.m.    Joan Arnold  PGY-1 OB/GYN  01/24/25  2:43 AM

## 2025-01-24 NOTE — DISCHARGE INSTR - AVS FIRST PAGE
Congratulations Mikala!    For pain:   Tylenol: Up to 1000mg (975mg = 3 tablets) every 6 hours  Motrin: Up to 600mg (1 tablet prescription or 3 tablets of the regular strength 200mg ibuprofen) every 6 hours    I recommend alternating Tylenol and Motrin every 3 hours (Tylenol dosing then 3 hours later Motrin dosing then 3 hours later Tylenol dosing then 3 hours later Motrin dosing, etc.)    Please call the office with any concerns especially if you have any chest pain, shortness of breath, fevers, chills, changes in your vision, persistent headache, elevated blood pressures (>140/>90) or pain in your upper belly on the right side, or concerns about your stiches or bleeding. We will see you in the office in 3 weeks.    Take care,   - Dr. Fregoso

## 2025-01-24 NOTE — PLAN OF CARE
Problem: POSTPARTUM  Goal: Experiences normal postpartum course  Description: INTERVENTIONS:  - Monitor maternal vital signs  - Assess uterine involution and lochia  Outcome: Progressing  Goal: Appropriate maternal -  bonding  Description: INTERVENTIONS:  - Identify family support  - Assess for appropriate maternal/infant bonding   -Encourage maternal/infant bonding opportunities  - Referral to  or  as needed  Outcome: Progressing  Goal: Establishment of infant feeding pattern  Description: INTERVENTIONS:  - Assess breast/bottle feeding  - Refer to lactation as needed  Outcome: Progressing  Goal: Incision(s), wounds(s) or drain site(s) healing without S/S of infection  Description: INTERVENTIONS  - Assess and document dressing, incision, wound bed, drain sites and surrounding tissue  - Provide patient and family education  - Perform skin care/dressing changes every   Outcome: Progressing     Problem: PAIN - ADULT  Goal: Verbalizes/displays adequate comfort level or baseline comfort level  Description: Interventions:  - Encourage patient to monitor pain and request assistance  - Assess pain using appropriate pain scale  - Administer analgesics based on type and severity of pain and evaluate response  - Implement non-pharmacological measures as appropriate and evaluate response  - Consider cultural and social influences on pain and pain management  - Notify physician/advanced practitioner if interventions unsuccessful or patient reports new pain  Outcome: Progressing     Problem: INFECTION - ADULT  Goal: Absence or prevention of progression during hospitalization  Description: INTERVENTIONS:  - Assess and monitor for signs and symptoms of infection  - Monitor lab/diagnostic results  - Monitor all insertion sites, i.e. indwelling lines, tubes, and drains  - Monitor endotracheal if appropriate and nasal secretions for changes in amount and color  - Tyro appropriate cooling/warming  therapies per order  - Administer medications as ordered  - Instruct and encourage patient and family to use good hand hygiene technique  - Identify and instruct in appropriate isolation precautions for identified infection/condition  Outcome: Progressing  Goal: Absence of fever/infection during neutropenic period  Description: INTERVENTIONS:  - Monitor WBC    Outcome: Progressing     Problem: SAFETY ADULT  Goal: Patient will remain free of falls  Description: INTERVENTIONS:  - Educate patient/family on patient safety including physical limitations  - Instruct patient to call for assistance with activity   - Consult OT/PT to assist with strengthening/mobility   - Keep Call bell within reach  - Keep bed low and locked with side rails adjusted as appropriate  - Keep care items and personal belongings within reach  - Initiate and maintain comfort rounds  - Make Fall Risk Sign visible to staff  - Offer Toileting   Outcome: Progressing  Goal: Maintain or return to baseline ADL function  Description: INTERVENTIONS:  -  Assess patient's ability to carry out ADLs; assess patient's baseline for ADL function and identify physical deficits which impact ability to perform ADLs (bathing, care of mouth/teeth, toileting, grooming, dressing, etc.)  - Assess/evaluate cause of self-care deficits   - Assess range of motion  - Assess patient's mobility; develop plan if impaired  - Assess patient's need for assistive devices and provide as appropriate  - Encourage maximum independence but intervene and supervise when necessary  - Involve family in performance of ADLs  - Assess for home care needs following discharge   - Consider OT consult to assist with ADL evaluation and planning for discharge  - Provide patient education as appropriate  Outcome: Progressing     Problem: DISCHARGE PLANNING  Goal: Discharge to home or other facility with appropriate resources  Description: INTERVENTIONS:  - Identify barriers to discharge w/patient and  caregiver  - Arrange for needed discharge resources and transportation as appropriate  - Identify discharge learning needs (meds, wound care, etc.)  - Arrange for interpretive services to assist at discharge as needed  - Refer to Case Management Department for coordinating discharge planning if the patient needs post-hospital services based on physician/advanced practitioner order or complex needs related to functional status, cognitive ability, or social support system  Outcome: Progressing

## 2025-01-24 NOTE — PLAN OF CARE
Problem: PAIN - ADULT  Goal: Verbalizes/displays adequate comfort level or baseline comfort level  Description: Interventions:  - Encourage patient to monitor pain and request assistance  - Assess pain using appropriate pain scale  - Administer analgesics based on type and severity of pain and evaluate response  - Implement non-pharmacological measures as appropriate and evaluate response  - Consider cultural and social influences on pain and pain management  - Notify physician/advanced practitioner if interventions unsuccessful or patient reports new pain  Outcome: Progressing     Problem: INFECTION - ADULT  Goal: Absence or prevention of progression during hospitalization  Description: INTERVENTIONS:  - Assess and monitor for signs and symptoms of infection  - Monitor lab/diagnostic results  - Monitor all insertion sites, i.e. indwelling lines, tubes, and drains  - Monitor endotracheal if appropriate and nasal secretions for changes in amount and color  - Mims appropriate cooling/warming therapies per order  - Administer medications as ordered  - Instruct and encourage patient and family to use good hand hygiene technique  - Identify and instruct in appropriate isolation precautions for identified infection/condition  Outcome: Progressing  Goal: Absence of fever/infection during neutropenic period  Description: INTERVENTIONS:  - Monitor WBC    Outcome: Progressing     Problem: SAFETY ADULT  Goal: Patient will remain free of falls  Description: INTERVENTIONS:  - Educate patient/family on patient safety including physical limitations  - Instruct patient to call for assistance with activity   - Consult OT/PT to assist with strengthening/mobility   - Keep Call bell within reach  - Keep bed low and locked with side rails adjusted as appropriate  - Keep care items and personal belongings within reach  - Initiate and maintain comfort rounds  - Make Fall Risk Sign visible to staff  - Offer Toileting   Outcome:  Progressing  Goal: Maintain or return to baseline ADL function  Description: INTERVENTIONS:  -  Assess patient's ability to carry out ADLs; assess patient's baseline for ADL function and identify physical deficits which impact ability to perform ADLs (bathing, care of mouth/teeth, toileting, grooming, dressing, etc.)  - Assess/evaluate cause of self-care deficits   - Assess range of motion  - Assess patient's mobility; develop plan if impaired  - Assess patient's need for assistive devices and provide as appropriate  - Encourage maximum independence but intervene and supervise when necessary  - Involve family in performance of ADLs  - Assess for home care needs following discharge   - Consider OT consult to assist with ADL evaluation and planning for discharge  - Provide patient education as appropriate  Outcome: Progressing     Problem: DISCHARGE PLANNING  Goal: Discharge to home or other facility with appropriate resources  Description: INTERVENTIONS:  - Identify barriers to discharge w/patient and caregiver  - Arrange for needed discharge resources and transportation as appropriate  - Identify discharge learning needs (meds, wound care, etc.)  - Arrange for interpretive services to assist at discharge as needed  - Refer to Case Management Department for coordinating discharge planning if the patient needs post-hospital services based on physician/advanced practitioner order or complex needs related to functional status, cognitive ability, or social support system  Outcome: Progressing     Problem: POSTPARTUM  Goal: Experiences normal postpartum course  Description: INTERVENTIONS:  - Monitor maternal vital signs  - Assess uterine involution and lochia  Outcome: Progressing  Goal: Appropriate maternal -  bonding  Description: INTERVENTIONS:  - Identify family support  - Assess for appropriate maternal/infant bonding   -Encourage maternal/infant bonding opportunities  - Referral to  or case  manager as needed  Outcome: Progressing  Goal: Establishment of infant feeding pattern  Description: INTERVENTIONS:  - Assess breast/bottle feeding  - Refer to lactation as needed  Outcome: Progressing  Goal: Incision(s), wounds(s) or drain site(s) healing without S/S of infection  Description: INTERVENTIONS  - Assess and document dressing, incision, wound bed, drain sites and surrounding tissue  - Provide patient and family education  - Perform skin care/dressing changes every PRN  Outcome: Progressing

## 2025-01-24 NOTE — ASSESSMENT & PLAN NOTE
Lab Results   Component Value Date    HGBA1C 5.3 11/09/2023       Recent Labs     01/23/25  1044 01/23/25  1230 01/23/25  1343 01/23/25  1502   POCGLU 81 84 88 88     2 hr GTT post partum    Blood Sugar Average: Last 72 hrs:  (P) 89.75

## 2025-01-24 NOTE — ASSESSMENT & PLAN NOTE
Continue routine post partum care  Pain well controlled: tylenol/motrin scheduled  Lochia within normal limits: continue to monitor   OOB: as able, encourage ambulation  Passing flatus  Voiding spontaneously

## 2025-01-24 NOTE — PROGRESS NOTES
Progress Note - OB/GYN   Name: Mikala Cunha 29 y.o. female I MRN: [id number]  Unit/Bed#: -01 I Date of Admission: 2025   Date of Service: 2025 I Hospital Day: 2     Assessment & Plan   (spontaneous vaginal delivery)  Continue routine post partum care  Pain well controlled: tylenol/motrin scheduled  Lochia within normal limits: continue to monitor   OOB: as able, encourage ambulation  Passing flatus  Voiding spontaneously    GDM, class A2  Lab Results   Component Value Date    HGBA1C 5.3 2023       Recent Labs     25  1044 25  1230 25  1343 25  1502   POCGLU 81 84 88 88     2 hr GTT post partum    Blood Sugar Average: Last 72 hrs:  (P) 89.75    Chorioamnionitis  S/p 2 doses of Unasyn  Patient afebrile overnight  Last temperature  at 0930 101.3  Atony of uterus without hemorrhage  -Patient passed extra 345cc of blood clots overnight  -TAUS showed thin strip and no signs of clots/retained products at the fundus  -Bimanual exam notable for small clots and boggy lower uterine segment  -IM methergine given    OB Post-Partum Progress Note  Subjective   Post delivery. Patient is doing well. Lochia WNL. Pain well controlled.     Pain: yes, cramping, improved with meds  Tolerating PO: yes  Voiding: yes  Flatus: yes  BM: no  Ambulating: yes  Breastfeeding:  yes  Chest pain: no  Shortness of breath: no  Leg pain: no  Lochia: WNL    Objective :  Temp:  [97.5 °F (36.4 °C)-103.4 °F (39.7 °C)] 97.6 °F (36.4 °C)  HR:  [] 74  BP: ()/() 101/55  Resp:  [16-18] 16  SpO2:  [97 %-100 %] 97 %  O2 Device: None (Room air)    Physical Exam  Vitals and nursing note reviewed.   Constitutional:       General: She is not in acute distress.     Appearance: She is well-developed.   HENT:      Head: Normocephalic and atraumatic.   Eyes:      Conjunctiva/sclera: Conjunctivae normal.   Cardiovascular:      Rate and Rhythm: Normal rate and regular rhythm.      Heart sounds: No  murmur heard.  Pulmonary:      Effort: Pulmonary effort is normal. No respiratory distress.      Breath sounds: Normal breath sounds.   Abdominal:      Palpations: Abdomen is soft.      Tenderness: There is no abdominal tenderness.   Musculoskeletal:         General: No swelling.      Cervical back: Neck supple.   Skin:     General: Skin is warm and dry.      Capillary Refill: Capillary refill takes less than 2 seconds.   Neurological:      Mental Status: She is alert.   Psychiatric:         Mood and Affect: Mood normal.         Lab Results: I have reviewed the following results:  Lab Results   Component Value Date    WBC 19.02 (H) 01/24/2025    HGB 8.8 (L) 01/24/2025    HCT 27.2 (L) 01/24/2025    MCV 87 01/24/2025     01/24/2025     Joan Hearn  PGY-1 OB/GYN  01/24/25  4:48 AM

## 2025-01-24 NOTE — ASSESSMENT & PLAN NOTE
-Patient passed extra 345cc of blood clots overnight  -TAUS showed thin strip and no signs of clots/retained products at the fundus  -Bimanual exam notable for small clots and boggy lower uterine segment  -IM methergine given

## 2025-01-27 ENCOUNTER — TELEPHONE (OUTPATIENT)
Dept: OBGYN CLINIC | Facility: CLINIC | Age: 30
End: 2025-01-27

## 2025-01-27 PROCEDURE — 88307 TISSUE EXAM BY PATHOLOGIST: CPT | Performed by: PATHOLOGY

## 2025-01-27 NOTE — TELEPHONE ENCOUNTER
POSTPARTUM PHONE CALL ASSESSMENT    Date of Delivery: 25  Delivering Provider: Dr. Ospina  Mode:      Delivery Notes/Complications: gdm   Do you still have bleeding/pain? If so, how much/how severe? Vaginal bleeding was lessening but increased today. Not fully saturating pads. Denies pain but reports perineal soreness, reviewed continued milan care, ice as needed, dermoplast and witch hazel pads, alternating tylenol and motrin as needed.   Regular BMs/Urination? No BM yet, taking colace gummies once daily. Reviewed increasing BID and adding miralax, to use prn, hydrating well, fiber and to notify office if unable to have BM or if abdominal pain/discomfort. Urinating however pt reports does not feel urge or sensation to go. Reviewed timed urination and to notify office if unable to void or if symptoms do not improve.  Breastfeeding/Formula/Both? Bottle feeding, latching has been painful. Has appt with pediatrician today and pt reports will review wt and recommendations. Reviewed [facility], lactation support services. Pt states she has contact info for [facility] and was thinking of reaching out for recommendations as well.   How are you doing emotionally? Doing well per patient, tired, has support at home  Do you have any other questions or concerns for us or your provider? None at this time  Have you scheduled the pediatrician appointment with pediatrician? yes  Do you have a postpartum visit scheduled? Offered and scheduled, 25

## 2025-01-27 NOTE — UTILIZATION REVIEW
"NOTIFICATION OF INPATIENT ADMISSION   MATERNITY/DELIVERY AUTHORIZATION REQUEST   SERVICING FACILITY:   [facility]  Parent Child Health - L&D, [place], NICU  [address] [place] [address]  Tax ID: [id number]  NPI: [id number]   ATTENDING PROVIDER:  Attending Name and NPI#: Padma Ospina Md [[id number]]  Address: [address] [place] [address]  Phone: [phone]   ADMISSION INFORMATION:  Place of Service: Inpatient [facility]  Place of Service Code: 21  Inpatient Admission Date/Time: 25  3:10 PM  Discharge Date/Time: 2025  7:26 PM  Admitting Diagnosis Code/Description:  Encounter for induction of labor [Z34.90]  Encounter for full-term uncomplicated delivery [O80]     Mother: Mikala Cunha 1995 Estimated Date of Delivery: 25  Delivering clinician: Padma Ospina   OB History          1    Para   1    Term   1       0    AB   0    Living   1         SAB   0    IAB   0    Ectopic   0    Multiple   0    Live Births   1                Name & MRN:   Information for the patient's :  Tayla Cervantes [[id number]]    Delivery Information:  Sex: female  Delivered 2025 4:07 PM by Vaginal, Spontaneous; Gestational Age: 38w1d    [place] Measurements:  Weight: 7 lb 9.3 oz (3440 g);  Height: 20\"    APGAR 1 minute 5 minutes 10 minutes   Totals: 8 9       UTILIZATION REVIEW CONTACT:  Luisa Bowen, Utilization   Network Utilization Review Department  Phone: [phone]  Fax [phone]  Email: [email or web address]  Contact for approvals/pending authorizations, clinical reviews, and discharge.     PHYSICIAN ADVISORY SERVICES:  Medical Necessity Denial & Gjbx-di-Wufk Review  Phone: [phone]  Fax: [phone]  Email: [email or web address]     DISCHARGE SUPPORT TEAM:  For Patients Discharge Needs & Updates  Phone: [phone] opt. 2 Fax: [phone]  Email: " [email or web address]          NOTIFICATION OF ADMISSION DISCHARGE   This is a Notification of Discharge from [facility]. Please be advised that this patient has been discharge from our facility. Below you will find the admission and discharge date and time including the patient’s disposition.   UTILIZATION REVIEW CONTACT:  Luisa Bowen  Utilization   Network Utilization Review Department  Phone: [phone] x carefully listen to the prompts. All voicemails are confidential.  Email: [email or web address]     ADMISSION INFORMATION  PRESENTATION DATE: 1/22/2025  3:10 PM  OBERVATION ADMISSION DATE: N/A  INPATIENT ADMISSION DATE: 1/22/25  3:10 PM   DISCHARGE DATE: 1/24/2025  7:26 PM   DISPOSITION:Home/Self Care    Network Utilization Review Department  ATTENTION: Please call with any questions or concerns to [phone] and carefully listen to the prompts so that you are directed to the right person. All voicemails are confidential.   For Discharge needs, contact Care Management DC Support Team at [phone] opt. 2  Send all requests for admission clinical reviews, approved or denied determinations and any other requests to dedicated fax number below belonging to the campus where the patient is receiving treatment. List of dedicated fax numbers for the Facilities:  FACILITY NAME UR FAX NUMBER   ADMISSION DENIALS (Administrative/Medical Necessity) [phone]   DISCHARGE SUPPORT TEAM ([facility]) [phone]   PARENT CHILD HEALTH (Maternity/NICU/Pediatrics) [phone]   [facility] [phone]   [facility] [phone]   Critical access hospital [phone]   [facility] [phone]   [facility] [phone]   [facility] [phone]   [facility] [phone]   [facility]  [facility] [phone]   [facility] [phone]   [facility] [phone]   [facility] [phone]   [facility] [phone]

## 2025-01-30 DIAGNOSIS — Z13.1 DIABETES MELLITUS SCREENING: Primary | ICD-10-CM

## 2025-01-30 DIAGNOSIS — Z86.32 HISTORY OF GESTATIONAL DIABETES MELLITUS (GDM), NOT CURRENTLY PREGNANT: ICD-10-CM
